# Patient Record
Sex: MALE | Race: WHITE | Employment: FULL TIME | URBAN - METROPOLITAN AREA
[De-identification: names, ages, dates, MRNs, and addresses within clinical notes are randomized per-mention and may not be internally consistent; named-entity substitution may affect disease eponyms.]

---

## 2018-06-19 ENCOUNTER — HOSPITAL ENCOUNTER (OUTPATIENT)
Facility: CLINIC | Age: 63
Discharge: HOME | End: 2018-06-19
Attending: COLON & RECTAL SURGERY | Admitting: COLON & RECTAL SURGERY
Payer: COMMERCIAL

## 2018-06-19 ENCOUNTER — HOSPITAL ENCOUNTER (OUTPATIENT)
Dept: GENERAL RADIOLOGY | Facility: CLINIC | Age: 63
End: 2018-06-19
Attending: COLON & RECTAL SURGERY | Admitting: COLON & RECTAL SURGERY
Payer: COMMERCIAL

## 2018-06-19 ENCOUNTER — SURGERY (OUTPATIENT)
Age: 63
End: 2018-06-19

## 2018-06-19 VITALS
RESPIRATION RATE: 21 BRPM | WEIGHT: 140 LBS | SYSTOLIC BLOOD PRESSURE: 113 MMHG | OXYGEN SATURATION: 97 % | HEIGHT: 67 IN | BODY MASS INDEX: 21.97 KG/M2 | DIASTOLIC BLOOD PRESSURE: 82 MMHG

## 2018-06-19 DIAGNOSIS — K63.89 COLONIC MASS: ICD-10-CM

## 2018-06-19 LAB — COLONOSCOPY: NORMAL

## 2018-06-19 PROCEDURE — 74283 THER NMA RDCTJ INTUS/OBSTRCJ: CPT

## 2018-06-19 PROCEDURE — G0500 MOD SEDAT ENDO SERVICE >5YRS: HCPCS | Performed by: COLON & RECTAL SURGERY

## 2018-06-19 PROCEDURE — 45380 COLONOSCOPY AND BIOPSY: CPT | Performed by: COLON & RECTAL SURGERY

## 2018-06-19 PROCEDURE — 45385 COLONOSCOPY W/LESION REMOVAL: CPT | Performed by: COLON & RECTAL SURGERY

## 2018-06-19 PROCEDURE — 25000128 H RX IP 250 OP 636: Performed by: COLON & RECTAL SURGERY

## 2018-06-19 PROCEDURE — 88305 TISSUE EXAM BY PATHOLOGIST: CPT | Mod: 26 | Performed by: COLON & RECTAL SURGERY

## 2018-06-19 PROCEDURE — 99153 MOD SED SAME PHYS/QHP EA: CPT

## 2018-06-19 PROCEDURE — 88305 TISSUE EXAM BY PATHOLOGIST: CPT | Performed by: COLON & RECTAL SURGERY

## 2018-06-19 RX ORDER — FENTANYL CITRATE 50 UG/ML
INJECTION, SOLUTION INTRAMUSCULAR; INTRAVENOUS PRN
Status: DISCONTINUED | OUTPATIENT
Start: 2018-06-19 | End: 2018-06-19 | Stop reason: HOSPADM

## 2018-06-19 RX ORDER — DIPHENHYDRAMINE HCL 25 MG
25 CAPSULE ORAL EVERY 4 HOURS PRN
Status: DISCONTINUED | OUTPATIENT
Start: 2018-06-19 | End: 2018-06-19 | Stop reason: HOSPADM

## 2018-06-19 RX ORDER — LIDOCAINE 40 MG/G
CREAM TOPICAL
Status: DISCONTINUED | OUTPATIENT
Start: 2018-06-19 | End: 2018-06-19 | Stop reason: HOSPADM

## 2018-06-19 RX ORDER — ONDANSETRON 2 MG/ML
4 INJECTION INTRAMUSCULAR; INTRAVENOUS
Status: DISCONTINUED | OUTPATIENT
Start: 2018-06-19 | End: 2018-06-19 | Stop reason: HOSPADM

## 2018-06-19 RX ORDER — DIPHENHYDRAMINE HYDROCHLORIDE 50 MG/ML
25 INJECTION INTRAMUSCULAR; INTRAVENOUS EVERY 4 HOURS PRN
Status: DISCONTINUED | OUTPATIENT
Start: 2018-06-19 | End: 2018-06-19 | Stop reason: HOSPADM

## 2018-06-19 RX ORDER — NALOXONE HYDROCHLORIDE 0.4 MG/ML
.1-.4 INJECTION, SOLUTION INTRAMUSCULAR; INTRAVENOUS; SUBCUTANEOUS
Status: DISCONTINUED | OUTPATIENT
Start: 2018-06-19 | End: 2018-06-19 | Stop reason: HOSPADM

## 2018-06-19 RX ORDER — ONDANSETRON 4 MG/1
4 TABLET, ORALLY DISINTEGRATING ORAL EVERY 6 HOURS PRN
Status: DISCONTINUED | OUTPATIENT
Start: 2018-06-19 | End: 2018-06-19 | Stop reason: HOSPADM

## 2018-06-19 RX ORDER — FLUMAZENIL 0.1 MG/ML
0.2 INJECTION, SOLUTION INTRAVENOUS
Status: DISCONTINUED | OUTPATIENT
Start: 2018-06-19 | End: 2018-06-19 | Stop reason: HOSPADM

## 2018-06-19 RX ORDER — ONDANSETRON 2 MG/ML
4 INJECTION INTRAMUSCULAR; INTRAVENOUS EVERY 6 HOURS PRN
Status: DISCONTINUED | OUTPATIENT
Start: 2018-06-19 | End: 2018-06-19 | Stop reason: HOSPADM

## 2018-06-19 RX ORDER — PROCHLORPERAZINE MALEATE 10 MG
10 TABLET ORAL EVERY 6 HOURS PRN
Status: DISCONTINUED | OUTPATIENT
Start: 2018-06-19 | End: 2018-06-19 | Stop reason: HOSPADM

## 2018-06-19 RX ADMIN — MIDAZOLAM 2 MG: 1 INJECTION INTRAMUSCULAR; INTRAVENOUS at 10:41

## 2018-06-19 RX ADMIN — GLUCAGON HYDROCHLORIDE 0.5 MG: KIT at 11:15

## 2018-06-19 RX ADMIN — DIATRIZOATE MEGLUMINE AND DIATRIZOATE SODIUM 480 ML: 660; 100 SOLUTION ORAL; RECTAL at 10:11

## 2018-06-19 RX ADMIN — FENTANYL CITRATE 100 MCG: 50 INJECTION, SOLUTION INTRAMUSCULAR; INTRAVENOUS at 10:41

## 2018-06-19 RX ADMIN — FENTANYL CITRATE 50 MCG: 50 INJECTION, SOLUTION INTRAMUSCULAR; INTRAVENOUS at 11:10

## 2018-06-19 NOTE — H&P
"Pre-Endoscopy History and Physical     Manuel Andre MRN# [id number]   YOB: 1955 Age: 63 year old     Date of Procedure: 6/19/2018  Primary care provider: Adiel Meade  Type of Endoscopy: colonoscopy  Reason for Procedure: screening  Type of Anesthesia Anticipated: Moderate Sedation    HPI:    Manuel is a 63 year old male who will be undergoing the above procedure.      A history and physical has been performed. The patient's medications and allergies have been reviewed. The risks and benefits of the procedure including the risk of bleeding, perforation, and missed lesions as well as the sedation options and risks were discussed with the patient.  All questions were answered and informed consent was obtained.      No Known Allergies     Current Facility-Administered Medications   Medication     lidocaine (LMX4) cream     lidocaine 1 % 1 mL     ondansetron (ZOFRAN) injection 4 mg     sodium chloride (PF) 0.9% PF flush 3 mL     sodium chloride (PF) 0.9% PF flush 3 mL       No prescriptions prior to admission.       There is no problem list on file for this patient.       History reviewed. No pertinent past medical history.     Past Surgical History:   Procedure Laterality Date     HERNIA REPAIR       STOMACH SURGERY      for  ulcer       Social History   Substance Use Topics     Smoking status: Current Every Day Smoker     Years: 45.00     Smokeless tobacco: Never Used      Comment: 15 cigarettes a day     Alcohol use Yes      Comment: occas       History reviewed. No pertinent family history.      PHYSICAL EXAM:   /82  Resp 16  Ht 1.7 m (5' 6.93\")  Wt 63.5 kg (140 lb)  SpO2 96%  BMI 21.97 kg/m2 Estimated body mass index is 21.97 kg/(m^2) as calculated from the following:    Height as of this encounter: 1.7 m (5' 6.93\").    Weight as of this encounter: 63.5 kg (140 lb).   Mental status - alert and oriented  RESP: lungs clear  CV: RRR  AIRWAY EXAM: Mallampatti Class II (visualization of the soft " palate, fauces, and uvula)    IMPRESSION   ASA Class 2 - Mild systemic disease      Signed Electronically by: Momo Andrews  June 19, 2018    Colorectal Surgery  [phone] (office)  [phone] (pager)  [email or web address]

## 2018-06-20 LAB — COPATH REPORT: NORMAL

## 2018-07-24 ENCOUNTER — HOSPITAL ENCOUNTER (INPATIENT)
Facility: CLINIC | Age: 63
Setting detail: SURGERY ADMIT
End: 2018-07-24
Attending: COLON & RECTAL SURGERY | Admitting: COLON & RECTAL SURGERY
Payer: COMMERCIAL

## 2018-07-24 ENCOUNTER — TRANSFERRED RECORDS (OUTPATIENT)
Dept: HEALTH INFORMATION MANAGEMENT | Facility: CLINIC | Age: 63
End: 2018-07-24

## 2018-07-26 ENCOUNTER — HOSPITAL ENCOUNTER (OUTPATIENT)
Dept: GENERAL RADIOLOGY | Facility: CLINIC | Age: 63
Discharge: HOME | End: 2018-07-26
Attending: COLON & RECTAL SURGERY | Admitting: COLON & RECTAL SURGERY
Payer: COMMERCIAL

## 2018-07-26 DIAGNOSIS — K63.3 ULCER OF SMALL INTESTINE: ICD-10-CM

## 2018-07-26 PROCEDURE — 74245 XR UPPER GI W SMALL BOWEL FOLLOW THROUGH: CPT

## 2018-07-26 PROCEDURE — 25500045 ZZH RX 255: Performed by: COLON & RECTAL SURGERY

## 2018-07-26 RX ADMIN — ANTACID/ANTIFLATULENT 4 G: 380; 550; 10; 10 GRANULE, EFFERVESCENT ORAL at 07:53

## 2018-08-06 ENCOUNTER — OFFICE VISIT (OUTPATIENT)
Dept: SURGERY | Facility: CLINIC | Age: 63
End: 2018-08-06
Payer: COMMERCIAL

## 2018-08-06 VITALS
BODY MASS INDEX: 22.5 KG/M2 | DIASTOLIC BLOOD PRESSURE: 84 MMHG | HEART RATE: 74 BPM | HEIGHT: 66 IN | SYSTOLIC BLOOD PRESSURE: 124 MMHG | WEIGHT: 140 LBS

## 2018-08-06 DIAGNOSIS — K63.2 ENTERO-ENTERIC FISTULA: Primary | ICD-10-CM

## 2018-08-06 PROCEDURE — 99243 OFF/OP CNSLTJ NEW/EST LOW 30: CPT | Performed by: SURGERY

## 2018-08-06 NOTE — MR AVS SNAPSHOT
"              After Visit Summary   2018    Manuel Andre    MRN: [id number]           Patient Information     Date Of Birth          1955        Visit Information        Provider Department      2018 9:45 AM Ace Harding MD; [facility] General Surgery       Follow-ups after your visit        Who to contact     If you have questions or need follow up information about today's clinic visit or your schedule please contact [facility] directly at [phone].  Normal or non-critical lab and imaging results will be communicated to you by [employer], letter or phone within 4 business days after the clinic has received the results. If you do not hear from us within 7 days, please contact the clinic through [employer] or phone. If you have a critical or abnormal lab result, we will notify you by phone as soon as possible.  Submit refill requests through [employer] or call your pharmacy and they will forward the refill request to us. Please allow 3 business days for your refill to be completed.          Additional Information About Your Visit        MyChart Information     [employer] lets you send messages to your doctor, view your test results, renew your prescriptions, schedule appointments and more. To sign up, go to [email or web address] . Click on \"Log in\" on the left side of the screen, which will take you to the Welcome page. Then click on \"Sign up Now\" on the right side of the page.     You will be asked to enter the access code listed below, as well as some personal information. Please follow the directions to create your username and password.     Your access code is: [email or web address]  Expires: 10/24/2018  7:17 AM     Your access code will  in 90 days. If you need help or a new code, please call your [place] clinic or [phone].        Care EveryWhere ID     This is your Care EveryWhere ID. This could be used by other " "organizations to access your [place] medical records  [id number]        Your Vitals Were     Pulse Height BMI (Body Mass Index)             74 5' 6\" (1.676 m) 22.6 kg/m2          Blood Pressure from Last 3 Encounters:   08/06/18 124/84   06/19/18 113/82    Weight from Last 3 Encounters:   08/06/18 140 lb (63.5 kg)   06/19/18 140 lb (63.5 kg)              Today, you had the following     No orders found for display       Primary Care Provider Office Phone # Fax #    Adiel Meade [phone]       [facility] [address] [place] [address]        Equal Access to Services     CONCHITA MALDONADO : Everardo Rico, sophia ochoa, qastephanie kaalmasrikanth salazar, lola castillo . So [facility] [phone].    ATENCIÓN: Si habla español, tiene a juarez disposición servicios gratuitos de asistencia lingüística. Llame al [phone].    We comply with applicable federal civil rights laws and [place] laws. We do not discriminate on the basis of race, color, national origin, age, disability, sex, sexual orientation, or gender identity.            Thank you!     Thank you for choosing [facility]  for your care. Our goal is always to provide you with excellent care. Hearing back from our patients is one way we can continue to improve our services. Please take a few minutes to complete the written survey that you may receive in the mail after your visit with us. Thank you!             Your Updated Medication List - Protect others around you: Learn how to safely use, store and throw away your medicines at www.disposemymeds.org.          This list is accurate as of 8/6/18 10:28 AM.  Always use your most recent med list.                   Brand Name Dispense Instructions for use Diagnosis    OMEPRAZOLE PO             "

## 2018-08-06 NOTE — LETTER
2018    Re: Manuel Andre, : 1955    HISTORY OF PRESENT ILLNESS:  Manuel Andre is a 63 year old male who is seen in consultation at the request of Dr. 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  Mr. Andre is here with his daughter and a , he describes his previous operation as perforated stomach ulcers or portion of the stomach was removed.  Is evident that with his symptoms and radiologic findings he has developed, likely multiple, gastroenteric and or gastrocolonic fistulas.  He continues to smoke.     REVIEW OF SYSTEMS:  Constitutional:  Negative for chills, fatigue, fever.  He has lost significant weight over the last few months change.  Neuro: No extremity, nor facial weakness  Psych:  No unexpected changes in mood  Eyes:  Negative for new vision problems.  ENT:  Negative for ENT pain.  Cardiovascular:  Negative for chest pain, palpitations.  Respiratory:  Negative for cough, dyspnea.  Gastrointestinal:  Negative for abdominal pain.  Significant diarrhea with any oral intake.  Musculoskeletal:  Negative for new arthralgias or myalgias.  Integumentary: No new rashes nor lesions     EXAM:  GENERAL: healthy, alert and no distress      HEENT: moist mucus membranes, no scleral icterus,   CARDIOVASCULAR:  RRR, No JVD  NEURO:  Alert;  well oriented to time, place and person.  RESPIRATORY: non labored breathing  NECK: Neck supple. No noticeable masses.  No lymphadenopathy noted.  ABDOMEN/GI: soft, nontender, nondistended  EXTREMITIES: warm and well perfused, no edema  SKIN: No suspicious lesions or rashes     LABS/Imaging: Endoscopies, radiologic studies, and laboratories reviewed with patient.     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     PLAN:  Abdominal exploration with lysis of adhesion, fistulas takedown and bowel resection as necessary.     Manuel Andre understands the risk, benefits, hopeful outcomes, and  possible complications, both in the short and in the long term.  All his questions answered, he will like to proceed with the propose procedure in the near future.     It is my pleasure to participate in the care of Manuel Andre. Thank you for this consultation.      If you have any questions please give me a call.     Best regards,  Ace Harding MD

## 2018-08-10 NOTE — PROGRESS NOTES
[facility] General Surgery Clinic Consultation    CHIEF COMPLAINT:  Chief Complaint   Patient presents with     Consult     insestinal fistula       HISTORY OF PRESENT ILLNESS:  Manuel Andre is a 63 year old male who is seen in consultation at the request of Dr. 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  Mr. Andre is here with his daughter and a , he describes his previous operation as perforated stomach ulcers or portion of the stomach was removed.  Is evident that with his symptoms and radiologic findings he has developed, likely multiple, gastroenteric and or gastrocolonic fistulas.  He continues to smoke.    REVIEW OF SYSTEMS:  Constitutional:  Negative for chills, fatigue, fever.  He has lost significant weight over the last few months change.  Neuro: No extremity, nor facial weakness  Psych:  No unexpected changes in mood  Eyes:  Negative for new vision problems.  ENT:  Negative for ENT pain.  Cardiovascular:  Negative for chest pain, palpitations.  Respiratory:  Negative for cough, dyspnea.  Gastrointestinal:  Negative for abdominal pain.  Significant diarrhea with any oral intake.  Musculoskeletal:  Negative for new arthralgias or myalgias.  Integumentary: No new rashes nor lesions    Past Medical History:   Diagnosis Date     History of blood transfusion        Past Surgical History:   Procedure Laterality Date     COLONOSCOPY N/A 6/19/2018    Procedure: COMBINED COLONOSCOPY, SINGLE OR MULTIPLE BIOPSY/POLYPECTOMY BY BIOPSY;;  Surgeon: Momo Andrews MD;  Location:  GI     HERNIA REPAIR       STOMACH SURGERY      for  ulcer       Family History has been reviewed.    Social History   Substance Use Topics     Smoking status: Current Every Day Smoker     Years: 45.00     Smokeless tobacco: Never Used      Comment: 15 cigarettes a day     Alcohol use Yes      Comment: occas       There is no problem list on file for this patient.      No Known  "Allergies    Current Outpatient Prescriptions   Medication Sig Dispense Refill     OMEPRAZOLE PO          Vitals: /84  Pulse 74  Ht 5' 6\" (1.676 m)  Wt 140 lb (63.5 kg)  BMI 22.6 kg/m2  BMI= Body mass index is 22.6 kg/(m^2).    EXAM:  GENERAL: healthy, alert and no distress     HEENT: moist mucus membranes, no scleral icterus,   CARDIOVASCULAR:  RRR, No JVD  NEURO:  Alert;  well oriented to time, place and person.  RESPIRATORY: non labored breathing  NECK: Neck supple. No noticeable masses.  No lymphadenopathy noted.  ABDOMEN/GI: soft, nontender, nondistended  EXTREMITIES: warm and well perfused, no edema  SKIN: No suspicious lesions or rashes    LABS/Imaging: Endoscopies, radiologic studies, and laboratories reviewed with patient.    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    PLAN:  Abdominal exploration with lysis of adhesion, fistulas takedown and bowel resection as necessary.    Manuel Andre understands the risk, benefits, hopeful outcomes, and possible complications, both in the short and in the long term.  All his questions answered, he will like to proceed with the propose procedure in the near future.    It is my pleasure to participate in the care of Manuel Andre. Thank you for this consultation.     If you have any questions please give me a call.    Best regards,  Ace Harding MD    Please route or send letter to:  Primary Care Provider (PCP), Referring Provider and Include Progress Note    Total time with patient visit: 45 minutes more than half spent in counseling, explanation of procedures and coordination of care.    "

## 2018-08-15 ENCOUNTER — TELEPHONE (OUTPATIENT)
Dept: SURGERY | Facility: CLINIC | Age: 63
End: 2018-08-15

## 2018-08-15 NOTE — TELEPHONE ENCOUNTER
Type of surgery: abdominal exploration bowel resection coordinated with Dr Andrews  Location of surgery: [facility]  Date and time of surgery: 09/21/18 7:30 am  Surgeon: Dr Harding  Pre-Op Appt Date: pt to schedule  Post-Op Appt Date: pt to schedule   Packet sent out: Yes  Pre-cert/Authorization completed:  Not Applicable  Date: 08/06/18    AM Admit  General anesthesia  Dr Andrews's office will send bowel prep to pt

## 2018-09-01 ENCOUNTER — APPOINTMENT (OUTPATIENT)
Dept: LAB | Facility: CLINIC | Age: 63
End: 2018-09-01
Attending: COLON & RECTAL SURGERY
Payer: COMMERCIAL

## 2018-09-13 ENCOUNTER — HOSPITAL ENCOUNTER (OUTPATIENT)
Facility: CLINIC | Age: 63
Discharge: HOME | End: 2018-09-13
Attending: COLON & RECTAL SURGERY | Admitting: COLON & RECTAL SURGERY
Payer: COMMERCIAL

## 2018-09-13 PROCEDURE — 27210220 ZZH KIT SHRLOCK 5FR DUAL LUM PICC

## 2018-09-13 PROCEDURE — 36569 INSJ PICC 5 YR+ W/O IMAGING: CPT

## 2018-09-17 ENCOUNTER — MEDICAL CORRESPONDENCE (OUTPATIENT)
Dept: PHARMACY | Facility: CLINIC | Age: 63
End: 2018-09-17

## 2018-09-17 ENCOUNTER — HOME INFUSION (PRE-WILLOW HOME INFUSION) (OUTPATIENT)
Dept: PHARMACY | Facility: CLINIC | Age: 63
End: 2018-09-17

## 2018-09-17 PROCEDURE — 80053 COMPREHEN METABOLIC PANEL: CPT | Performed by: COLON & RECTAL SURGERY

## 2018-09-17 PROCEDURE — 85025 COMPLETE CBC W/AUTO DIFF WBC: CPT | Performed by: COLON & RECTAL SURGERY

## 2018-09-17 PROCEDURE — 82248 BILIRUBIN DIRECT: CPT | Performed by: COLON & RECTAL SURGERY

## 2018-09-17 PROCEDURE — 84134 ASSAY OF PREALBUMIN: CPT | Performed by: COLON & RECTAL SURGERY

## 2018-09-17 PROCEDURE — 84100 ASSAY OF PHOSPHORUS: CPT | Performed by: COLON & RECTAL SURGERY

## 2018-09-17 PROCEDURE — 84478 ASSAY OF TRIGLYCERIDES: CPT | Performed by: COLON & RECTAL SURGERY

## 2018-09-17 PROCEDURE — 83735 ASSAY OF MAGNESIUM: CPT | Performed by: COLON & RECTAL SURGERY

## 2018-09-18 ENCOUNTER — HOME INFUSION (PRE-WILLOW HOME INFUSION) (OUTPATIENT)
Dept: PHARMACY | Facility: CLINIC | Age: 63
End: 2018-09-18

## 2018-09-18 LAB
ALBUMIN SERPL-MCNC: 2.6 G/DL (ref 3.4–5)
ALP SERPL-CCNC: 106 U/L (ref 40–150)
ALT SERPL W P-5'-P-CCNC: 46 U/L (ref 0–70)
ANION GAP SERPL CALCULATED.3IONS-SCNC: 8 MMOL/L (ref 3–14)
AST SERPL W P-5'-P-CCNC: 36 U/L (ref 0–45)
BASOPHILS # BLD AUTO: 0 10E9/L (ref 0–0.2)
BASOPHILS NFR BLD AUTO: 0.2 %
BILIRUB DIRECT SERPL-MCNC: <0.1 MG/DL (ref 0–0.2)
BILIRUB SERPL-MCNC: 0.4 MG/DL (ref 0.2–1.3)
BUN SERPL-MCNC: 11 MG/DL (ref 7–30)
CALCIUM SERPL-MCNC: 7.9 MG/DL (ref 8.5–10.1)
CHLORIDE SERPL-SCNC: 112 MMOL/L (ref 94–109)
CO2 SERPL-SCNC: 21 MMOL/L (ref 20–32)
CREAT SERPL-MCNC: 0.63 MG/DL (ref 0.66–1.25)
DIFFERENTIAL METHOD BLD: ABNORMAL
EOSINOPHIL # BLD AUTO: 0.2 10E9/L (ref 0–0.7)
EOSINOPHIL NFR BLD AUTO: 2.4 %
ERYTHROCYTE [DISTWIDTH] IN BLOOD BY AUTOMATED COUNT: 14 % (ref 10–15)
GFR SERPL CREATININE-BSD FRML MDRD: >90 ML/MIN/1.7M2
GLUCOSE SERPL-MCNC: 87 MG/DL (ref 70–99)
HCT VFR BLD AUTO: 31.2 % (ref 40–53)
HGB BLD-MCNC: 10 G/DL (ref 13.3–17.7)
IMM GRANULOCYTES # BLD: 0 10E9/L (ref 0–0.4)
IMM GRANULOCYTES NFR BLD: 0.1 %
LYMPHOCYTES # BLD AUTO: 1.9 10E9/L (ref 0.8–5.3)
LYMPHOCYTES NFR BLD AUTO: 22.2 %
MAGNESIUM SERPL-MCNC: 1.9 MG/DL (ref 1.6–2.3)
MCH RBC QN AUTO: 33.1 PG (ref 26.5–33)
MCHC RBC AUTO-ENTMCNC: 32.1 G/DL (ref 31.5–36.5)
MCV RBC AUTO: 103 FL (ref 78–100)
MONOCYTES # BLD AUTO: 0.7 10E9/L (ref 0–1.3)
MONOCYTES NFR BLD AUTO: 8.2 %
NEUTROPHILS # BLD AUTO: 5.6 10E9/L (ref 1.6–8.3)
NEUTROPHILS NFR BLD AUTO: 66.9 %
NRBC # BLD AUTO: 0 10*3/UL
NRBC BLD AUTO-RTO: 0 /100
PHOSPHATE SERPL-MCNC: 2.6 MG/DL (ref 2.5–4.5)
PLATELET # BLD AUTO: 263 10E9/L (ref 150–450)
POTASSIUM SERPL-SCNC: 4.5 MMOL/L (ref 3.4–5.3)
PREALB SERPL IA-MCNC: 14 MG/DL (ref 15–45)
PROT SERPL-MCNC: 5.9 G/DL (ref 6.8–8.8)
RBC # BLD AUTO: 3.02 10E12/L (ref 4.4–5.9)
SODIUM SERPL-SCNC: 141 MMOL/L (ref 133–144)
TRIGL SERPL-MCNC: 95 MG/DL
WBC # BLD AUTO: 8.3 10E9/L (ref 4–11)

## 2018-09-18 NOTE — PROGRESS NOTES
This is a recent snapshot of the patient's [facility] medical record.  For current drug dose and complete information and questions, call [phone] or In [facility] pool, fv home infusion ([id number])  CSN Number:  [id number]

## 2018-09-19 NOTE — PROGRESS NOTES
This is a recent snapshot of the patient's [facility] medical record.  For current drug dose and complete information and questions, call [phone] or In [facility] pool, fv home infusion ([id number])  CSN Number:  [id number]

## 2018-09-21 ENCOUNTER — HOSPITAL ENCOUNTER (INPATIENT)
Facility: CLINIC | Age: 63
LOS: 5 days | Discharge: HOME HEALTH | End: 2018-09-26
Attending: SURGERY | Admitting: SURGERY
Payer: COMMERCIAL

## 2018-09-21 ENCOUNTER — ANESTHESIA (OUTPATIENT)
Dept: SURGERY | Facility: CLINIC | Age: 63
End: 2018-09-21
Payer: COMMERCIAL

## 2018-09-21 ENCOUNTER — ANESTHESIA EVENT (OUTPATIENT)
Dept: SURGERY | Facility: CLINIC | Age: 63
End: 2018-09-21
Payer: COMMERCIAL

## 2018-09-21 ENCOUNTER — APPOINTMENT (OUTPATIENT)
Dept: SURGERY | Facility: PHYSICIAN GROUP | Age: 63
End: 2018-09-21
Payer: COMMERCIAL

## 2018-09-21 ENCOUNTER — APPOINTMENT (OUTPATIENT)
Dept: GENERAL RADIOLOGY | Facility: CLINIC | Age: 63
End: 2018-09-21
Attending: SURGERY
Payer: COMMERCIAL

## 2018-09-21 DIAGNOSIS — K63.2 COLO-ENTERIC FISTULA: Primary | ICD-10-CM

## 2018-09-21 PROBLEM — K31.6 GASTROCOLIC FISTULA: Status: ACTIVE | Noted: 2018-09-21

## 2018-09-21 LAB
ALBUMIN SERPL-MCNC: 2.1 G/DL (ref 3.4–5)
ALP SERPL-CCNC: 91 U/L (ref 40–150)
ALT SERPL W P-5'-P-CCNC: 41 U/L (ref 0–70)
ANION GAP SERPL CALCULATED.3IONS-SCNC: 6 MMOL/L (ref 3–14)
AST SERPL W P-5'-P-CCNC: 35 U/L (ref 0–45)
BILIRUB SERPL-MCNC: 0.3 MG/DL (ref 0.2–1.3)
BUN SERPL-MCNC: 4 MG/DL (ref 7–30)
CALCIUM SERPL-MCNC: 7.9 MG/DL (ref 8.5–10.1)
CHLORIDE SERPL-SCNC: 107 MMOL/L (ref 94–109)
CO2 SERPL-SCNC: 27 MMOL/L (ref 20–32)
CREAT SERPL-MCNC: 0.63 MG/DL (ref 0.66–1.25)
GFR SERPL CREATININE-BSD FRML MDRD: >90 ML/MIN/1.7M2
GLUCOSE BLDC GLUCOMTR-MCNC: 108 MG/DL (ref 70–99)
GLUCOSE BLDC GLUCOMTR-MCNC: 98 MG/DL (ref 70–99)
GLUCOSE SERPL-MCNC: 91 MG/DL (ref 70–99)
HGB BLD-MCNC: 11.5 G/DL (ref 13.3–17.7)
INR PPP: 1.05 (ref 0.86–1.14)
MAGNESIUM SERPL-MCNC: 1.6 MG/DL (ref 1.6–2.3)
PHOSPHATE SERPL-MCNC: 4.2 MG/DL (ref 2.5–4.5)
PLATELET # BLD AUTO: 237 10E9/L (ref 150–450)
POTASSIUM SERPL-SCNC: 3.8 MMOL/L (ref 3.4–5.3)
PROT SERPL-MCNC: 5.2 G/DL (ref 6.8–8.8)
SODIUM SERPL-SCNC: 140 MMOL/L (ref 133–144)
TRIGL SERPL-MCNC: 44 MG/DL

## 2018-09-21 PROCEDURE — 85049 AUTOMATED PLATELET COUNT: CPT | Performed by: PHYSICIAN ASSISTANT

## 2018-09-21 PROCEDURE — 25000125 ZZHC RX 250

## 2018-09-21 PROCEDURE — 00000146 ZZHCL STATISTIC GLUCOSE BY METER IP

## 2018-09-21 PROCEDURE — 0DBA0ZZ EXCISION OF JEJUNUM, OPEN APPROACH: ICD-10-PCS | Performed by: SURGERY

## 2018-09-21 PROCEDURE — 44650 REPAIR BOWEL FISTULA: CPT | Mod: AS | Performed by: PHYSICIAN ASSISTANT

## 2018-09-21 PROCEDURE — 71000013 ZZH RECOVERY PHASE 1 LEVEL 1 EA ADDTL HR: Performed by: SURGERY

## 2018-09-21 PROCEDURE — 25000128 H RX IP 250 OP 636: Performed by: NURSE ANESTHETIST, CERTIFIED REGISTERED

## 2018-09-21 PROCEDURE — 12000007 ZZH R&B INTERMEDIATE

## 2018-09-21 PROCEDURE — 25000132 ZZH RX MED GY IP 250 OP 250 PS 637: Performed by: SURGERY

## 2018-09-21 PROCEDURE — 36000063 ZZH SURGERY LEVEL 4 EA 15 ADDTL MIN: Performed by: SURGERY

## 2018-09-21 PROCEDURE — 25000128 H RX IP 250 OP 636: Performed by: PHYSICIAN ASSISTANT

## 2018-09-21 PROCEDURE — 84478 ASSAY OF TRIGLYCERIDES: CPT | Performed by: SURGERY

## 2018-09-21 PROCEDURE — 84100 ASSAY OF PHOSPHORUS: CPT | Performed by: SURGERY

## 2018-09-21 PROCEDURE — 71000012 ZZH RECOVERY PHASE 1 LEVEL 1 FIRST HR: Performed by: SURGERY

## 2018-09-21 PROCEDURE — 0DNU0ZZ RELEASE OMENTUM, OPEN APPROACH: ICD-10-PCS | Performed by: SURGERY

## 2018-09-21 PROCEDURE — 27210794 ZZH OR GENERAL SUPPLY STERILE: Performed by: SURGERY

## 2018-09-21 PROCEDURE — 40000239 ZZH STATISTIC VAT ROUNDS

## 2018-09-21 PROCEDURE — 88304 TISSUE EXAM BY PATHOLOGIST: CPT | Performed by: SURGERY

## 2018-09-21 PROCEDURE — 25000125 ZZHC RX 250: Performed by: PHYSICIAN ASSISTANT

## 2018-09-21 PROCEDURE — 88304 TISSUE EXAM BY PATHOLOGIST: CPT | Mod: 26 | Performed by: SURGERY

## 2018-09-21 PROCEDURE — 36000093 ZZH SURGERY LEVEL 4 1ST 30 MIN: Performed by: SURGERY

## 2018-09-21 PROCEDURE — 27210995 ZZH RX 272: Performed by: SURGERY

## 2018-09-21 PROCEDURE — 43633 REMOVAL OF STOMACH PARTIAL: CPT | Mod: AS | Performed by: PHYSICIAN ASSISTANT

## 2018-09-21 PROCEDURE — 0DB60ZZ EXCISION OF STOMACH, OPEN APPROACH: ICD-10-PCS | Performed by: SURGERY

## 2018-09-21 PROCEDURE — 43633 REMOVAL OF STOMACH PARTIAL: CPT | Mod: 51 | Performed by: SURGERY

## 2018-09-21 PROCEDURE — 37000009 ZZH ANESTHESIA TECHNICAL FEE, EACH ADDTL 15 MIN: Performed by: SURGERY

## 2018-09-21 PROCEDURE — 83735 ASSAY OF MAGNESIUM: CPT | Performed by: SURGERY

## 2018-09-21 PROCEDURE — 25000125 ZZHC RX 250: Performed by: SURGERY

## 2018-09-21 PROCEDURE — 25000128 H RX IP 250 OP 636

## 2018-09-21 PROCEDURE — 44650 REPAIR BOWEL FISTULA: CPT | Performed by: SURGERY

## 2018-09-21 PROCEDURE — 25000566 ZZH SEVOFLURANE, EA 15 MIN: Performed by: SURGERY

## 2018-09-21 PROCEDURE — 40000257 ZZH STATISTIC CONSULT NO CHARGE VASC ACCESS

## 2018-09-21 PROCEDURE — 25000125 ZZHC RX 250: Performed by: NURSE ANESTHETIST, CERTIFIED REGISTERED

## 2018-09-21 PROCEDURE — 80053 COMPREHEN METABOLIC PANEL: CPT | Performed by: SURGERY

## 2018-09-21 PROCEDURE — 40000170 ZZH STATISTIC PRE-PROCEDURE ASSESSMENT II: Performed by: SURGERY

## 2018-09-21 PROCEDURE — 0D160ZA BYPASS STOMACH TO JEJUNUM, OPEN APPROACH: ICD-10-PCS | Performed by: SURGERY

## 2018-09-21 PROCEDURE — 85610 PROTHROMBIN TIME: CPT | Performed by: SURGERY

## 2018-09-21 PROCEDURE — 0DBL0ZZ EXCISION OF TRANSVERSE COLON, OPEN APPROACH: ICD-10-PCS | Performed by: COLON & RECTAL SURGERY

## 2018-09-21 PROCEDURE — 25000132 ZZH RX MED GY IP 250 OP 250 PS 637: Performed by: COLON & RECTAL SURGERY

## 2018-09-21 PROCEDURE — 71045 X-RAY EXAM CHEST 1 VIEW: CPT

## 2018-09-21 PROCEDURE — 85018 HEMOGLOBIN: CPT | Performed by: ANESTHESIOLOGY

## 2018-09-21 PROCEDURE — 25000128 H RX IP 250 OP 636: Performed by: ANESTHESIOLOGY

## 2018-09-21 PROCEDURE — 37000008 ZZH ANESTHESIA TECHNICAL FEE, 1ST 30 MIN: Performed by: SURGERY

## 2018-09-21 RX ORDER — MAGNESIUM HYDROXIDE 1200 MG/15ML
LIQUID ORAL PRN
Status: DISCONTINUED | OUTPATIENT
Start: 2018-09-21 | End: 2018-09-21 | Stop reason: HOSPADM

## 2018-09-21 RX ORDER — KETOROLAC TROMETHAMINE 15 MG/ML
15 INJECTION, SOLUTION INTRAMUSCULAR; INTRAVENOUS EVERY 6 HOURS PRN
Status: DISCONTINUED | OUTPATIENT
Start: 2018-09-21 | End: 2018-09-26 | Stop reason: HOSPADM

## 2018-09-21 RX ORDER — SODIUM CHLORIDE, SODIUM LACTATE, POTASSIUM CHLORIDE, CALCIUM CHLORIDE 600; 310; 30; 20 MG/100ML; MG/100ML; MG/100ML; MG/100ML
INJECTION, SOLUTION INTRAVENOUS CONTINUOUS PRN
Status: DISCONTINUED | OUTPATIENT
Start: 2018-09-21 | End: 2018-09-21

## 2018-09-21 RX ORDER — DEXAMETHASONE SODIUM PHOSPHATE 4 MG/ML
INJECTION, SOLUTION INTRA-ARTICULAR; INTRALESIONAL; INTRAMUSCULAR; INTRAVENOUS; SOFT TISSUE PRN
Status: DISCONTINUED | OUTPATIENT
Start: 2018-09-21 | End: 2018-09-21

## 2018-09-21 RX ORDER — LIDOCAINE 40 MG/G
CREAM TOPICAL
Status: DISCONTINUED | OUTPATIENT
Start: 2018-09-21 | End: 2018-09-26 | Stop reason: HOSPADM

## 2018-09-21 RX ORDER — ONDANSETRON 4 MG/1
4 TABLET, ORALLY DISINTEGRATING ORAL EVERY 6 HOURS PRN
Status: DISCONTINUED | OUTPATIENT
Start: 2018-09-21 | End: 2018-09-26 | Stop reason: HOSPADM

## 2018-09-21 RX ORDER — CYANOCOBALAMIN 1000 UG/ML
1 INJECTION, SOLUTION INTRAMUSCULAR; SUBCUTANEOUS
COMMUNITY

## 2018-09-21 RX ORDER — CEFOTETAN DISODIUM 1 G/10ML
1 INJECTION, POWDER, FOR SOLUTION INTRAMUSCULAR; INTRAVENOUS EVERY 12 HOURS
Status: COMPLETED | OUTPATIENT
Start: 2018-09-21 | End: 2018-09-22

## 2018-09-21 RX ORDER — CELECOXIB 200 MG/1
200 CAPSULE ORAL ONCE
Status: COMPLETED | OUTPATIENT
Start: 2018-09-21 | End: 2018-09-21

## 2018-09-21 RX ORDER — ONDANSETRON 2 MG/ML
INJECTION INTRAMUSCULAR; INTRAVENOUS PRN
Status: DISCONTINUED | OUTPATIENT
Start: 2018-09-21 | End: 2018-09-21

## 2018-09-21 RX ORDER — ONDANSETRON 4 MG/1
4 TABLET, ORALLY DISINTEGRATING ORAL EVERY 30 MIN PRN
Status: DISCONTINUED | OUTPATIENT
Start: 2018-09-21 | End: 2018-09-21 | Stop reason: HOSPADM

## 2018-09-21 RX ORDER — CEFOTETAN DISODIUM 1 G/10ML
1 INJECTION, POWDER, FOR SOLUTION INTRAMUSCULAR; INTRAVENOUS
Status: COMPLETED | OUTPATIENT
Start: 2018-09-21 | End: 2018-09-21

## 2018-09-21 RX ORDER — FENTANYL CITRATE 50 UG/ML
25-50 INJECTION, SOLUTION INTRAMUSCULAR; INTRAVENOUS
Status: DISCONTINUED | OUTPATIENT
Start: 2018-09-21 | End: 2018-09-21 | Stop reason: HOSPADM

## 2018-09-21 RX ORDER — EPHEDRINE SULFATE 50 MG/ML
INJECTION, SOLUTION INTRAMUSCULAR; INTRAVENOUS; SUBCUTANEOUS PRN
Status: DISCONTINUED | OUTPATIENT
Start: 2018-09-21 | End: 2018-09-21

## 2018-09-21 RX ORDER — ACETAMINOPHEN 325 MG/1
975 TABLET ORAL ONCE
Status: DISCONTINUED | OUTPATIENT
Start: 2018-09-21 | End: 2018-09-21 | Stop reason: HOSPADM

## 2018-09-21 RX ORDER — NALOXONE HYDROCHLORIDE 0.4 MG/ML
.1-.4 INJECTION, SOLUTION INTRAMUSCULAR; INTRAVENOUS; SUBCUTANEOUS
Status: DISCONTINUED | OUTPATIENT
Start: 2018-09-21 | End: 2018-09-22

## 2018-09-21 RX ORDER — HYDROMORPHONE HYDROCHLORIDE 1 MG/ML
.3-.5 INJECTION, SOLUTION INTRAMUSCULAR; INTRAVENOUS; SUBCUTANEOUS EVERY 5 MIN PRN
Status: DISCONTINUED | OUTPATIENT
Start: 2018-09-21 | End: 2018-09-21 | Stop reason: HOSPADM

## 2018-09-21 RX ORDER — LIDOCAINE HYDROCHLORIDE 20 MG/ML
INJECTION, SOLUTION INFILTRATION; PERINEURAL PRN
Status: DISCONTINUED | OUTPATIENT
Start: 2018-09-21 | End: 2018-09-21

## 2018-09-21 RX ORDER — SODIUM CHLORIDE, SODIUM LACTATE, POTASSIUM CHLORIDE, CALCIUM CHLORIDE 600; 310; 30; 20 MG/100ML; MG/100ML; MG/100ML; MG/100ML
INJECTION, SOLUTION INTRAVENOUS CONTINUOUS
Status: DISCONTINUED | OUTPATIENT
Start: 2018-09-21 | End: 2018-09-21 | Stop reason: HOSPADM

## 2018-09-21 RX ORDER — ALVIMOPAN 12 MG/1
12 CAPSULE ORAL ONCE
Status: COMPLETED | OUTPATIENT
Start: 2018-09-21 | End: 2018-09-21

## 2018-09-21 RX ORDER — NICOTINE POLACRILEX 4 MG
15-30 LOZENGE BUCCAL
Status: DISCONTINUED | OUTPATIENT
Start: 2018-09-21 | End: 2018-09-26 | Stop reason: HOSPADM

## 2018-09-21 RX ORDER — SODIUM CHLORIDE, SODIUM LACTATE, POTASSIUM CHLORIDE, CALCIUM CHLORIDE 600; 310; 30; 20 MG/100ML; MG/100ML; MG/100ML; MG/100ML
INJECTION, SOLUTION INTRAVENOUS CONTINUOUS
Status: ACTIVE | OUTPATIENT
Start: 2018-09-21 | End: 2018-09-23

## 2018-09-21 RX ORDER — GLYCOPYRROLATE 0.2 MG/ML
INJECTION, SOLUTION INTRAMUSCULAR; INTRAVENOUS PRN
Status: DISCONTINUED | OUTPATIENT
Start: 2018-09-21 | End: 2018-09-21

## 2018-09-21 RX ORDER — ACETAMINOPHEN 325 MG/1
975 TABLET ORAL ONCE
Status: COMPLETED | OUTPATIENT
Start: 2018-09-21 | End: 2018-09-21

## 2018-09-21 RX ORDER — PROCHLORPERAZINE MALEATE 5 MG
10 TABLET ORAL EVERY 6 HOURS PRN
Status: DISCONTINUED | OUTPATIENT
Start: 2018-09-21 | End: 2018-09-26 | Stop reason: HOSPADM

## 2018-09-21 RX ORDER — CEFOTETAN DISODIUM 1 G/10ML
1 INJECTION, POWDER, FOR SOLUTION INTRAMUSCULAR; INTRAVENOUS SEE ADMIN INSTRUCTIONS
Status: DISCONTINUED | OUTPATIENT
Start: 2018-09-21 | End: 2018-09-21 | Stop reason: HOSPADM

## 2018-09-21 RX ORDER — FENTANYL CITRATE 50 UG/ML
INJECTION, SOLUTION INTRAMUSCULAR; INTRAVENOUS PRN
Status: DISCONTINUED | OUTPATIENT
Start: 2018-09-21 | End: 2018-09-21

## 2018-09-21 RX ORDER — ONDANSETRON 2 MG/ML
4 INJECTION INTRAMUSCULAR; INTRAVENOUS EVERY 30 MIN PRN
Status: DISCONTINUED | OUTPATIENT
Start: 2018-09-21 | End: 2018-09-21 | Stop reason: HOSPADM

## 2018-09-21 RX ORDER — VECURONIUM BROMIDE 1 MG/ML
INJECTION, POWDER, LYOPHILIZED, FOR SOLUTION INTRAVENOUS PRN
Status: DISCONTINUED | OUTPATIENT
Start: 2018-09-21 | End: 2018-09-21

## 2018-09-21 RX ORDER — ONDANSETRON 2 MG/ML
4 INJECTION INTRAMUSCULAR; INTRAVENOUS EVERY 6 HOURS PRN
Status: DISCONTINUED | OUTPATIENT
Start: 2018-09-21 | End: 2018-09-26 | Stop reason: HOSPADM

## 2018-09-21 RX ORDER — NALOXONE HYDROCHLORIDE 0.4 MG/ML
.1-.4 INJECTION, SOLUTION INTRAMUSCULAR; INTRAVENOUS; SUBCUTANEOUS
Status: DISCONTINUED | OUTPATIENT
Start: 2018-09-21 | End: 2018-09-26 | Stop reason: HOSPADM

## 2018-09-21 RX ORDER — NEOSTIGMINE METHYLSULFATE 1 MG/ML
VIAL (ML) INJECTION PRN
Status: DISCONTINUED | OUTPATIENT
Start: 2018-09-21 | End: 2018-09-21

## 2018-09-21 RX ORDER — PROPOFOL 10 MG/ML
INJECTION, EMULSION INTRAVENOUS PRN
Status: DISCONTINUED | OUTPATIENT
Start: 2018-09-21 | End: 2018-09-21

## 2018-09-21 RX ORDER — DEXTROSE MONOHYDRATE 25 G/50ML
25-50 INJECTION, SOLUTION INTRAVENOUS
Status: DISCONTINUED | OUTPATIENT
Start: 2018-09-21 | End: 2018-09-26 | Stop reason: HOSPADM

## 2018-09-21 RX ADMIN — FENTANYL CITRATE 50 MCG: 50 INJECTION, SOLUTION INTRAMUSCULAR; INTRAVENOUS at 08:06

## 2018-09-21 RX ADMIN — PROPOFOL 70 MG: 10 INJECTION, EMULSION INTRAVENOUS at 07:44

## 2018-09-21 RX ADMIN — Medication 5 MG: at 07:57

## 2018-09-21 RX ADMIN — Medication 5 MG: at 07:51

## 2018-09-21 RX ADMIN — VECURONIUM BROMIDE 2 MG: 1 INJECTION, POWDER, LYOPHILIZED, FOR SOLUTION INTRAVENOUS at 08:00

## 2018-09-21 RX ADMIN — LIDOCAINE HYDROCHLORIDE 60 MG: 20 INJECTION, SOLUTION INFILTRATION; PERINEURAL at 07:44

## 2018-09-21 RX ADMIN — FENTANYL CITRATE 100 MCG: 50 INJECTION, SOLUTION INTRAMUSCULAR; INTRAVENOUS at 07:44

## 2018-09-21 RX ADMIN — I.V. FAT EMULSION 250 ML: 20 EMULSION INTRAVENOUS at 20:29

## 2018-09-21 RX ADMIN — CEFOTETAN DISODIUM 1 G: 1 INJECTION, POWDER, FOR SOLUTION INTRAMUSCULAR; INTRAVENOUS at 20:30

## 2018-09-21 RX ADMIN — NEOSTIGMINE METHYLSULFATE 3 MG: 1 INJECTION, SOLUTION INTRAVENOUS at 10:50

## 2018-09-21 RX ADMIN — VECURONIUM BROMIDE 2 MG: 1 INJECTION, POWDER, LYOPHILIZED, FOR SOLUTION INTRAVENOUS at 08:55

## 2018-09-21 RX ADMIN — SODIUM CHLORIDE, POTASSIUM CHLORIDE, SODIUM LACTATE AND CALCIUM CHLORIDE: 600; 310; 30; 20 INJECTION, SOLUTION INTRAVENOUS at 06:50

## 2018-09-21 RX ADMIN — ASCORBIC ACID, VITAMIN A PALMITATE, CHOLECALCIFEROL, THIAMINE HYDROCHLORIDE, RIBOFLAVIN-5 PHOSPHATE SODIUM, PYRIDOXINE HYDROCHLORIDE, NIACINAMIDE, DEXPANTHENOL, ALPHA-TOCOPHEROL ACETATE, VITAMIN K1, FOLIC ACID, BIOTIN, CYANOCOBALAMIN: 200; 3300; 200; 6; 3.6; 6; 40; 15; 10; 150; 600; 60; 5 INJECTION, SOLUTION INTRAVENOUS at 20:30

## 2018-09-21 RX ADMIN — SODIUM CHLORIDE, POTASSIUM CHLORIDE, SODIUM LACTATE AND CALCIUM CHLORIDE: 600; 310; 30; 20 INJECTION, SOLUTION INTRAVENOUS at 11:23

## 2018-09-21 RX ADMIN — VECURONIUM BROMIDE 2 MG: 1 INJECTION, POWDER, LYOPHILIZED, FOR SOLUTION INTRAVENOUS at 08:15

## 2018-09-21 RX ADMIN — VECURONIUM BROMIDE 2 MG: 1 INJECTION, POWDER, LYOPHILIZED, FOR SOLUTION INTRAVENOUS at 08:47

## 2018-09-21 RX ADMIN — Medication: at 12:01

## 2018-09-21 RX ADMIN — FENTANYL CITRATE 25 MCG: 50 INJECTION INTRAMUSCULAR; INTRAVENOUS at 11:14

## 2018-09-21 RX ADMIN — FENTANYL CITRATE 25 MCG: 50 INJECTION INTRAMUSCULAR; INTRAVENOUS at 11:21

## 2018-09-21 RX ADMIN — FENTANYL CITRATE 50 MCG: 50 INJECTION, SOLUTION INTRAMUSCULAR; INTRAVENOUS at 08:01

## 2018-09-21 RX ADMIN — ACETAMINOPHEN 975 MG: 325 TABLET, FILM COATED ORAL at 06:22

## 2018-09-21 RX ADMIN — PHENYLEPHRINE HYDROCHLORIDE 100 MCG: 10 INJECTION, SOLUTION INTRAMUSCULAR; INTRAVENOUS; SUBCUTANEOUS at 08:31

## 2018-09-21 RX ADMIN — SODIUM CHLORIDE, POTASSIUM CHLORIDE, SODIUM LACTATE AND CALCIUM CHLORIDE 1000 ML: 600; 310; 30; 20 INJECTION, SOLUTION INTRAVENOUS at 15:08

## 2018-09-21 RX ADMIN — FENTANYL CITRATE 50 MCG: 50 INJECTION, SOLUTION INTRAMUSCULAR; INTRAVENOUS at 08:04

## 2018-09-21 RX ADMIN — DEXMEDETOMIDINE HYDROCHLORIDE 8 MCG: 100 INJECTION, SOLUTION INTRAVENOUS at 10:33

## 2018-09-21 RX ADMIN — GLYCOPYRROLATE 0.4 MG: 0.2 INJECTION, SOLUTION INTRAMUSCULAR; INTRAVENOUS at 10:50

## 2018-09-21 RX ADMIN — DEXAMETHASONE SODIUM PHOSPHATE 4 MG: 4 INJECTION, SOLUTION INTRA-ARTICULAR; INTRALESIONAL; INTRAMUSCULAR; INTRAVENOUS; SOFT TISSUE at 07:54

## 2018-09-21 RX ADMIN — CEFOTETAN DISODIUM 1 G: 1 INJECTION, POWDER, FOR SOLUTION INTRAMUSCULAR; INTRAVENOUS at 07:48

## 2018-09-21 RX ADMIN — HYDROMORPHONE HYDROCHLORIDE 0.3 MG: 1 INJECTION, SOLUTION INTRAMUSCULAR; INTRAVENOUS; SUBCUTANEOUS at 10:29

## 2018-09-21 RX ADMIN — SODIUM CHLORIDE, POTASSIUM CHLORIDE, SODIUM LACTATE AND CALCIUM CHLORIDE: 600; 310; 30; 20 INJECTION, SOLUTION INTRAVENOUS at 08:22

## 2018-09-21 RX ADMIN — SODIUM CHLORIDE, POTASSIUM CHLORIDE, SODIUM LACTATE AND CALCIUM CHLORIDE: 600; 310; 30; 20 INJECTION, SOLUTION INTRAVENOUS at 09:28

## 2018-09-21 RX ADMIN — ALVIMOPAN 12 MG: 12 CAPSULE ORAL at 06:22

## 2018-09-21 RX ADMIN — PHENYLEPHRINE HYDROCHLORIDE 100 MCG: 10 INJECTION, SOLUTION INTRAMUSCULAR; INTRAVENOUS; SUBCUTANEOUS at 08:21

## 2018-09-21 RX ADMIN — MIDAZOLAM 1 MG: 1 INJECTION INTRAMUSCULAR; INTRAVENOUS at 07:38

## 2018-09-21 RX ADMIN — ONDANSETRON 4 MG: 2 INJECTION INTRAMUSCULAR; INTRAVENOUS at 09:47

## 2018-09-21 RX ADMIN — VECURONIUM BROMIDE 1 MG: 1 INJECTION, POWDER, LYOPHILIZED, FOR SOLUTION INTRAVENOUS at 10:34

## 2018-09-21 RX ADMIN — CELECOXIB 200 MG: 200 CAPSULE ORAL at 06:22

## 2018-09-21 RX ADMIN — Medication 5 MG: at 07:54

## 2018-09-21 RX ADMIN — PHENYLEPHRINE HYDROCHLORIDE 100 MCG: 10 INJECTION, SOLUTION INTRAMUSCULAR; INTRAVENOUS; SUBCUTANEOUS at 08:24

## 2018-09-21 RX ADMIN — Medication 0.5 MG: at 11:34

## 2018-09-21 RX ADMIN — HYDROMORPHONE HYDROCHLORIDE 0.2 MG: 1 INJECTION, SOLUTION INTRAMUSCULAR; INTRAVENOUS; SUBCUTANEOUS at 10:30

## 2018-09-21 RX ADMIN — ROCURONIUM BROMIDE 30 MG: 10 INJECTION INTRAVENOUS at 07:44

## 2018-09-21 RX ADMIN — DEXMEDETOMIDINE HYDROCHLORIDE 12 MCG: 100 INJECTION, SOLUTION INTRAVENOUS at 10:31

## 2018-09-21 ASSESSMENT — ENCOUNTER SYMPTOMS: SEIZURES: 0

## 2018-09-21 ASSESSMENT — ACTIVITIES OF DAILY LIVING (ADL)
COGNITION: 0 - NO COGNITION ISSUES REPORTED
RETIRED_EATING: 0-->INDEPENDENT
ADLS_ACUITY_SCORE: 10
ADLS_ACUITY_SCORE: 10

## 2018-09-21 ASSESSMENT — LIFESTYLE VARIABLES: TOBACCO_USE: 1

## 2018-09-21 NOTE — IP AVS SNAPSHOT
"[place] [address] [facility]: [phone]                                              INTERAGENCY TRANSFER FORM - PHYSICIAN ORDERS   2018                    Hospital Admission Date: 2018  SHAMIKA ENW   : 1955  Sex: Male        Attending Provider: Ace Harding MD     Allergies:  No Known Allergies    Infection:  None   Service:  SURGERY    Ht:  1.75 m (5' 8.9\")   Wt:  58.3 kg (128 lb 8.5 oz)   Admission Wt:  59 kg (130 lb)    BMI:  19.04 kg/m 2   BSA:  1.68 m 2            Patient PCP Information     Provider PCP Type    Adiel Meade MD General      ED Clinical Impression     Diagnosis Description Comment Added By Time Added    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 Please follow your organizational policy for patients in this situation.         Medication Review      START taking        Dose / Directions Comments    oxyCODONE IR 5 MG tablet   Commonly known as:  ROXICODONE   Used for:  Delta-enteric fistula        Dose:  5 mg   Take 1 tablet (5 mg) by mouth every 4 hours as needed for moderate to severe pain   Quantity:  20 tablet   Refills:  0          CONTINUE these medications which have NOT CHANGED        Dose / Directions Comments    cyanocobalamin 1000 MCG/ML injection   Commonly known as:  VITAMIN B12        Dose:  1 mL   Inject 1 mL into the muscle every 30 days   Refills:  0        ZOFRAN ODT PO        Dose:  4 mg   Take 4 mg by mouth 3 times daily as needed for nausea   Refills:  0                  Further instructions from your care team     "   [facility]  Discharge Instructions: Post-Operative Bowel Surgery    ACTIVITY    Expect to feel tired after your surgery.  This will gradually resolve.      Take frequent, short walks and increase your activity gradually.      Avoid strenuous physical activity or heavy lifting greater than 15 lbs. for 4 weeks.  You may climb stairs.      You may drive without restrictions when you are not using any prescription pain medication and feel comfortable in a car.    You may return to work/school when you are comfortable without any prescription pain medication.    You may wear an abdominal binder for comfort for 2-3 weeks from your surgery.  You can wash the abdominal binder and dry it on low heat in the dryer.    WOUND CARE    You may remove your outer dressing or Band-Aids and shower 48 hours after the surgery.  Pat your incisions dry and leave them open to air.  Re-apply dressing (Band-Aids or gauze/tape) as needed for comfort or drainage.    You may have steri-strips (looks like white tape) or staples at your incisions.  You may peel off the steri-strips 2 weeks after your surgery.  If you have staples, they will be removed at your next office visit.    Do not soak your incisions in a tub or pool for 2 weeks.     Do not apply any lotions, creams, or ointments to your incisions.    A ridge under your incisions is normal and will gradually resolve.    DIET    Stay on a low fiber diet until your follow up appointment.  Avoid heavy, spicy, greasy meals and gas forming foods, such as cabbage, broccoli, and onions.      You may find your appetite to be diminished briefly after surgery.  You may take nutritional supplement shakes if you are able.     Drink plenty of fluids to stay hydrated.    PAIN    Expect some tenderness and discomfort at the incision site(s).  Use the prescribed pain medication at your discretion.  Expect gradual resolution of your pain over several days.    You  may take ibuprofen with food (unless you have been told not to) instead of or in addition to your prescribed pain medication.  If you are taking Norco or Percocet, do not take any additional acetaminophen/APAP/Tylenol.    Do not drink alcohol or drive while you are taking pain medications.    You may apply ice to your incisions in 20 minute intervals as needed for the next 24-48 hours.  After that time, consider switching to heat if you prefer.    EXPECTATIONS    ***Pain medications can cause constipation.  Limit use when possible.  Take over the counter stool softener/stimulant, such as Colace or Senna, 1-2 times a day with plenty of water.  You may take a mild over the counter laxative, such as Miralax or a suppository, as needed.  You may discontinue these medications once you are having regular bowel movements and/or are no longer taking your narcotic pain medication.    For laparoscopic surgery, you may have shoulder or upper back discomfort due to the gas used in surgery.  This is temporary and should resolve in 48-72 hours.  Short, frequent walks may help with this.      RETURN APPOINTMENT    Follow up with your surgeon in ***10-14 days.  Please call our office at [phone] to schedule your appointment.  We are located at [address] [place] [address].    CALL OUR OFFICE -[phone] IF YOU HAVE:     Chills or fever above 101  F.    Increased redness, warmth, or drainage at your incisions.    Significant bleeding.    Pain not relieved by your pain medication or rest.    Increasing pain after the first 48 hours.    Any other concerns or questions.    Revised May 2018      Summary of Visit     Reason for your hospital stay       The patient was in the hospital to have surgery       Reason for your hospital stay       Repair of gastro-entero-colic fistula             After Care     Activity       Your activity upon discharge: No heavy lifting or straining over 15lbs for 6 weeks        Diet       Follow this diet upon discharge: Low fiber diet for 1 week and then resume regular diet       IV access       **Ordering Provider MUST call/page Care Coordinator/ to discuss arranging this service**    You are going home with the following vascular access device: PICC.             Referrals     Home infusion referral       Your provider has referred you to: [facility]: [facility] [phone]   [email or web address]  Resumption of TPN    Local Address (if different from home address): NA    Anticipated Length of Therapy: weeks    Home Infusion Pharmacist to adjust therapy based on labs and clinical assessments: Yes    Labs:  May draw labs from Venous Catheter: Yes  Home Infusion Pharmacist to order labs based on therapy type and clinical assessments: Yes  Call/Fax Lab Results to: [phone]    Agency Staff to assess nursing needs for Infusion Therapy.    Access Device Management:  IV Access Type: PICC  Flush with Heparin and Normal Saline IVP PRN and routine site care (per agency protocol) to maintain access device? Yes    Ok to be off TPN 9/26/18 for 3-4 hours until TPN supply arrives at home so patient can discharge to home sooner.       Home infusion referral       Your provider has referred you to: [facility]: [facility] [place] [phone]   [email or web address]    Anticipated Length of Therapy: 10-14 days    Home Infusion Pharmacist to adjust therapy based on labs and clinical assessments: Yes    Labs:  May draw labs from Venous Catheter: Yes  Home Infusion Pharmacist to order labs based on therapy type and clinical assessments: Yes    Agency Staff to assess nursing needs for Infusion Therapy.    Access Device Management:  IV Access Type: PICC  Flush with Heparin and Normal Saline IVP PRN and routine site care (per agency protocol) to maintain access device? Yes             Follow-Up  Appointment Instructions     Future Labs/Procedures    Follow-up and recommended labs and tests      Comments:    Follow up in the office for your post op visit with Dr. Andrews at 2:30pm on 10/29/18.    Follow-up and recommended labs and tests      Comments:    Follow up 2 weeks      Follow-Up Appointment Instructions     Follow-up and recommended labs and tests        Follow up in the office for your post op visit with Dr. Andrews at 2:30pm on 10/29/18.       Follow-up and recommended labs and tests        Follow up 2 weeks             Statement of Approval     Ordered          09/26/18 1505  I have reviewed and agree with all the recommendations and orders detailed in this document.     Approved and electronically signed by:  Ace Harding MD

## 2018-09-21 NOTE — PROGRESS NOTES
Patient completed PreOp antibiotics  Neomycin 1000mg tid (started 9/20/18 ended 9/20/18) Last dose 9/20/18 at 2300  Metronidazole 500mg tid (started 9/20/18 ended 9/20/18) Last dose 9/20/18 at 2300

## 2018-09-21 NOTE — OP NOTE
SURGEON: Ace Harding MD   ASSISTANT SURGEON: Momo Andrews MD.  FIRST ASSISTANT: Ryder Finn PA-C , The physicians assistant was medically necessary for their expertise in hemostasis, suctioning, suturing, and retraction.    PREOPERATIVE DIAGNOSIS: Gastro-enteric-colonic fistula.   POSTOPERATIVE DIAGNOSIS: Same, intra-abdominal adhesions.  OPERATIVE PROCEDURE:   1. Abdominal exploration with repair of gastro-enteric-colonic fistula.   2. Lysis of adhesions.   3. Partial gastric resection.  4. Small bowel resection     ANESTHESIA: General.   ESTIMATED BLOOD LOSS: Less than 30 mL.   EVENTS: After induction of general endotracheal anesthesia,Manuel Andre abdomen was prepped and draped in the usual sterile fashion. A secondary midline incision was made, and electrocautery dissection down to and through the abdominal wall fascia. Immediately upon entering the abdomen safely, we encountered some adhesions from the omentum to the abdominal wall. These were taken down carefully, and no evidence of injury noted.  We then noted the mass effect of the fistulized loops of bowel.  We then isolated the stomach which needed some dissection of the liver, transverse colon, and small bowel leading to the region of the fistulas.  The small intestine was evaluated distally all the way to the cecum and no other pathology noted.  It was clear he had distal gastrectomy with Billroth II reconstruction.  We then isolated all the intestines leading to this fistulous, Dr. Andrews then transected the transverse colon, see his dictation.  I then utilized CADY stapler to transect the stomach, and the afferent and efferent limbs of small bowel.  LigaSure device was utilized to take the mesentery.  Specimen was then sent to pathology.  After this was done Dr. Andrews completed the colocolonic anastomosis, again see his dictation.  I then performed Irvin-en-Y reconstruction by attaching the stomach to the jejunum using a combination of CADY and  TA stapler, this was followed by anastomosis of the biliopancreatic limb 60 cm distal to the gastrojejunostomy.  This was also done with CADY and TA staplers.  Mesenteric defect closed with silk suture.  Staple crossing lines were reinforced with 3-0 Vicryl.  Dissection bed and staple lines were coated with a evicel material.  The abdomen was copiously irrigated until effluent was clear and free of debris.  Nasogastric tube was verified to be in good position.  All anastomosis appeared patent and secured.  The abdominal wall fascia was closed with looped 0 PDS. Skin was approximated with 4-0 Monocryl. Steri-Strips and sterile dressings were applied. No immediate complications. All counts correct.     DEEPALI FELTON MD

## 2018-09-21 NOTE — BRIEF OP NOTE
[facility] Brief Operative Note    Pre-operative diagnosis: GASTROCOLIC FISTULA   Post-operative diagnosis same   Procedure: Procedure(s):  ABDOMINAL EXPLORATION, RESECTION OF GASTROCOLOENTERIC  FISTULA  - Wound Class: II-Clean Contaminated   - Wound Class: II-Clean Contaminated   Surgeon(s): Surgeon(s) and Role:     * Ace Harding MD - Primary     * Momo Andrews MD - Assisting     * Ryder Finn PA-C - Assisting   Estimated blood loss: 50cc   Specimens:   ID Type Source Tests Collected by Time Destination   A : gastro colo interic fistula Tissue Gastro Esophageal Junction SURGICAL PATHOLOGY EXAM Ace Harding MD 9/21/2018  8:56 AM       Findings: Specimen resected.   Sewn end to end colocolostomy  Stapled gastrojejunostomy with ~50cm limb to JJ.  No complications    Ryder Finn PA-C  Office: [phone]  Pager: [phone]

## 2018-09-21 NOTE — IP AVS SNAPSHOT
MRN:[id number]                      After Visit Summary   9/21/2018    Manuel Andre    MRN: [id number]           Thank you!     Thank you for choosing [facility] for your care. Our goal is always to provide you with excellent care. Hearing back from our patients is one way we can continue to improve our services. Please take a few minutes to complete the written survey that you may receive in the mail after you visit with us. Thank you!        Patient Information     Date Of Birth          1955        Designated Caregiver       Most Recent Value    Caregiver    Will someone help with your care after discharge? yes    Name of designated caregiver Marisabel- daughter    Phone number of caregiver [phone]-cell    Caregiver address [address]      About your hospital stay     You were admitted on:  September 21, 2018 You last received care in the:  [place] [address] [facility]    You were discharged on:  September 26, 2018        Reason for your hospital stay       The patient was in the hospital to have surgery            Reason for your hospital stay       Repair of gastro-entero-colic fistula                  Who to Call     For medical emergencies, please call 911.  For non-urgent questions about your medical care, please call your primary care provider or clinic, [phone]  For questions related to your surgery, please call your surgery clinic        Attending Provider     Provider Specialty    Ace Harding MD Surgery       Primary Care Provider Office Phone # Fax #    Adiel Meade [phone]      After Care Instructions     Activity       Your activity upon discharge: No heavy lifting or straining over 15lbs for 6 weeks            Diet       Follow this diet upon discharge: Low fiber diet for 1 week and then resume regular diet            IV access       **Ordering Provider MUST call/page Care Coordinator/ to discuss arranging this  service**    You are going home with the following vascular access device: PICC.                  Follow-up Appointments     Follow-up and recommended labs and tests        Follow up in the office for your post op visit with Dr. Andrews at 2:30pm on 10/29/18.            Follow-up and recommended labs and tests        Follow up 2 weeks                  Additional Services     Home infusion referral       Your provider has referred you to: G: [facility] [phone]   [email or web address]  Resumption of TPN    Local Address (if different from home address): NA    Anticipated Length of Therapy: weeks    Home Infusion Pharmacist to adjust therapy based on labs and clinical assessments: Yes    Labs:  May draw labs from Venous Catheter: Yes  Home Infusion Pharmacist to order labs based on therapy type and clinical assessments: Yes  Call/Fax Lab Results to: [phone]    Agency Staff to assess nursing needs for Infusion Therapy.    Access Device Management:  IV Access Type: PICC  Flush with Heparin and Normal Saline IVP PRN and routine site care (per agency protocol) to maintain access device? Yes    Ok to be off TPN 9/26/18 for 3-4 hours until TPN supply arrives at home so patient can discharge to home sooner.            Home infusion referral       Your provider has referred you to: [facility]: [facility] [phone]   [email or web address]    Anticipated Length of Therapy: 10-14 days    Home Infusion Pharmacist to adjust therapy based on labs and clinical assessments: Yes    Labs:  May draw labs from Venous Catheter: Yes  Home Infusion Pharmacist to order labs based on therapy type and clinical assessments: Yes    Agency Staff to assess nursing needs for Infusion Therapy.    Access Device Management:  IV Access Type: PICC  Flush with Heparin and Normal Saline IVP PRN and routine site care (per agency  protocol) to maintain access device? Yes                  Further instructions from your care team       [facility]  Discharge Instructions: Post-Operative Bowel Surgery    ACTIVITY    Expect to feel tired after your surgery.  This will gradually resolve.      Take frequent, short walks and increase your activity gradually.      Avoid strenuous physical activity or heavy lifting greater than 15 lbs. for 4 weeks.  You may climb stairs.      You may drive without restrictions when you are not using any prescription pain medication and feel comfortable in a car.    You may return to work/school when you are comfortable without any prescription pain medication.    You may wear an abdominal binder for comfort for 2-3 weeks from your surgery.  You can wash the abdominal binder and dry it on low heat in the dryer.    WOUND CARE    You may remove your outer dressing or Band-Aids and shower 48 hours after the surgery.  Pat your incisions dry and leave them open to air.  Re-apply dressing (Band-Aids or gauze/tape) as needed for comfort or drainage.    You may have steri-strips (looks like white tape) or staples at your incisions.  You may peel off the steri-strips 2 weeks after your surgery.  If you have staples, they will be removed at your next office visit.    Do not soak your incisions in a tub or pool for 2 weeks.     Do not apply any lotions, creams, or ointments to your incisions.    A ridge under your incisions is normal and will gradually resolve.    DIET    Stay on a low fiber diet until your follow up appointment.  Avoid heavy, spicy, greasy meals and gas forming foods, such as cabbage, broccoli, and onions.      You may find your appetite to be diminished briefly after surgery.  You may take nutritional supplement shakes if you are able.     Drink plenty of fluids to stay hydrated.    PAIN    Expect some tenderness and discomfort at the incision site(s).  Use the prescribed  pain medication at your discretion.  Expect gradual resolution of your pain over several days.    You may take ibuprofen with food (unless you have been told not to) instead of or in addition to your prescribed pain medication.  If you are taking Norco or Percocet, do not take any additional acetaminophen/APAP/Tylenol.    Do not drink alcohol or drive while you are taking pain medications.    You may apply ice to your incisions in 20 minute intervals as needed for the next 24-48 hours.  After that time, consider switching to heat if you prefer.    EXPECTATIONS    ***Pain medications can cause constipation.  Limit use when possible.  Take over the counter stool softener/stimulant, such as Colace or Senna, 1-2 times a day with plenty of water.  You may take a mild over the counter laxative, such as Miralax or a suppository, as needed.  You may discontinue these medications once you are having regular bowel movements and/or are no longer taking your narcotic pain medication.    For laparoscopic surgery, you may have shoulder or upper back discomfort due to the gas used in surgery.  This is temporary and should resolve in 48-72 hours.  Short, frequent walks may help with this.      RETURN APPOINTMENT    Follow up with your surgeon in ***10-14 days.  Please call our office at [phone] to schedule your appointment.  We are located at [address] [place] [address].    CALL OUR OFFICE -[phone] IF YOU HAVE:     Chills or fever above 101  F.    Increased redness, warmth, or drainage at your incisions.    Significant bleeding.    Pain not relieved by your pain medication or rest.    Increasing pain after the first 48 hours.    Any other concerns or questions.    Revised May 2018      Pending Results     No orders found from 9/19/2018 to 9/22/2018.            Statement of Approval     Ordered          09/26/18 1505  I have reviewed and agree with all the recommendations and orders detailed in  "this document.     Approved and electronically signed by:  Ace Harding MD             Admission Information     Date & Time Provider Department Dept. Phone    9/21/2018 Ace Harding MD [place] [address] [facility] [phone]      Your Vitals Were     Blood Pressure Pulse Temperature Respirations Height Weight    97/62 (BP Location: Left arm) 76 99.3  F (37.4  C) (Oral) 18 1.75 m (5' 8.9\") 58.3 kg (128 lb 8.5 oz)    Pulse Oximetry BMI (Body Mass Index)                97% 19.04 kg/m2          Care EveryWhere ID     This is your Care EveryWhere ID. This could be used by other organizations to access your [facility] medical records  [id number]        Equal Access to Services     CIELO MALDONADO : Everardo Rico, waaxda luqadaha, qaybta kaalmada marlenyasrikanth, lola mistry. So [facility] [phone].    ATENCIÓN: Si habla español, tiene a juarez disposición servicios gratuitos de asistencia lingüística. Llame al [phone].    We comply with applicable federal civil rights laws and [place] laws. We do not discriminate on the basis of race, color, national origin, age, disability, sex, sexual orientation, or gender identity.               Review of your medicines      START taking        Dose / Directions    oxyCODONE IR 5 MG tablet   Commonly known as:  ROXICODONE   Used for:  Ava-enteric fistula        Dose:  5 mg   Take 1 tablet (5 mg) by mouth every 4 hours as needed for moderate to severe pain   Quantity:  20 tablet   Refills:  0         CONTINUE these medicines which have NOT CHANGED        Dose / Directions    cyanocobalamin 1000 MCG/ML injection   Commonly known as:  VITAMIN B12        Dose:  1 mL   Inject 1 mL into the muscle every 30 days   Refills:  0       ZOFRAN ODT PO        Dose:  4 mg   Take 4 mg by mouth 3 times daily as needed for nausea   Refills:  0            Where to get your medicines      Some of these will need a paper prescription and " others can be bought over the counter. Ask your nurse if you have questions.     Bring a paper prescription for each of these medications     oxyCODONE IR 5 MG tablet                Protect others around you: Learn how to safely use, store and throw away your medicines at www.disposemymeds.org.        Information about OPIOIDS     PRESCRIPTION OPIOIDS: WHAT YOU NEED TO KNOW   We gave you an opioid (narcotic) pain medicine. It is important to manage your pain, but opioids are not always the best choice. You should first try all the other options your care team gave you. Take this medicine for as short a time (and as few doses) as possible.    Some activities can increase your pain, such as bandage changes or therapy sessions. It may help to take your pain medicine 30 to 60 minutes before these activities. Reduce your stress by getting enough sleep, working on hobbies you enjoy and practicing relaxation or meditation. Talk to your care team about ways to manage your pain beyond prescription opioids.    These medicines have risks:    DO NOT drive when on new or higher doses of pain medicine. These medicines can affect your alertness and reaction times, and you could be arrested for driving under the influence (DUI). If you need to use opioids long-term, talk to your care team about driving.    DO NOT operate heavy machinery    DO NOT do any other dangerous activities while taking these medicines.    DO NOT drink any alcohol while taking these medicines.     If the opioid prescribed includes acetaminophen, DO NOT take with any other medicines that contain acetaminophen. Read all labels carefully. Look for the word  acetaminophen  or  Tylenol.  Ask your pharmacist if you have questions or are unsure.    You can get addicted to pain medicines, especially if you have a history of addiction (chemical, alcohol or substance dependence). Talk to your care team about ways to reduce this risk.    All opioids tend to cause  constipation. Drink plenty of water and eat foods that have a lot of fiber, such as fruits, vegetables, prune juice, apple juice and high-fiber cereal. Take a laxative (Miralax, milk of magnesia, Colace, Senna) if you don t move your bowels at least every other day. Other side effects include upset stomach, sleepiness, dizziness, throwing up, tolerance (needing more of the medicine to have the same effect), physical dependence and slowed breathing.    Store your pills in a secure place, locked if possible. We will not replace any lost or stolen medicine. If you don t finish your medicine, please throw away (dispose) as directed by your pharmacist. The [place] Pollution Control [employer] has more information about safe disposal: https://www.pca.[facility].[place].us/living-green/managing-unwanted-medications             Medication List: This is a list of all your medications and when to take them. Check marks below indicate your daily home schedule. Keep this list as a reference.      Medications           Morning Afternoon Evening Bedtime As Needed    cyanocobalamin 1000 MCG/ML injection   Commonly known as:  VITAMIN B12   Inject 1 mL into the muscle every 30 days                                   oxyCODONE IR 5 MG tablet   Commonly known as:  ROXICODONE   Take 1 tablet (5 mg) by mouth every 4 hours as needed for moderate to severe pain   Last time this was given:  5 mg on 9/26/2018  3:56 AM                                   ZOFRAN ODT PO   Take 4 mg by mouth 3 times daily as needed for nausea

## 2018-09-21 NOTE — OP NOTE
Procedure Date: 09/21/2018      DATE OF SERVICE 9/21/2018.      PREOPERATIVE DIAGNOSIS:  Gastrocolic fistula.      POSTOPERATIVE DIAGNOSIS:  Gastroenterocolic fistula.      PROCEDURES PERFORMED:  Exploratory laparotomy, transverse colectomy.      STAFF SURGEON:  Ace Harding MD.        CO-SURGEON:  Momo Andrews MD.        ASSISTANT:  Ryder Finn PA-C.        ANESTHESIA:  General.      ESTIMATED BLOOD LOSS:  50 mL.      SPECIMENS:  Gastrocolic enteric fistula.      FINDINGS:  The patient has a history of a partial distal gastrectomy with a loop gastrojejunostomy.  The fistula was essentially at the anastomosis fistulizing to the transverse colon.  We, therefore, resected the distal stomach, small bowel and transverse colon.  The colon was reconstructed with an end to end handsewn 2-layer anastomosis.  The stomach and small bowel was then reconstructed in a Irvin-en-Y fashion with a gastrojejunostomy with a 50 cm Irvin limb to a jejunojejunostomy.      INDICATIONS:  Please see Dr. Harding's note for details regarding the partial gastrectomy and small bowel resection as well as the foregut reconstruction.      INDICATIONS:  Manuel is a 63-year-old gentleman with a history of ulcer disease treated with a partial gastrectomy in [facility] many years ago.  He then developed diarrhea.  He was referred to gastroenterology who performed a colonoscopy and there was a possible mass in the transverse colon and the scope was incomplete.  The patient was referred to me for evaluation.  I repeated his colonoscopy and was able to complete the colonoscopy to the cecum.  In the area of the transverse colon, there was a tattoo with an ulcer was noted and a fistula was noted.  I was able to intubate the fistula and pass the scope quite a ways into the small bowel.  The small bowel and the ulcer as well as the fistula were biopsied and these were benign.  The patient then underwent an EGD, a Gastrografin enema as well as an upper GI small  bowel follow-through.  The EGD demonstrated a gastrojejunal anastomosis.  The upper GI small bowel follow-through and the Gastrografin enema demonstrated a gastroenterocolic fistula.  I referred the patient to Dr. Harding for assistance with this case given the involvement of the fistula to the foregut.  I reviewed the risks of surgery with the patient including the risk of bleeding, infection, anastomotic leak, injury to adjacent structures, need for further surgery, need for a possible stoma, urinary tract infection, DVT, complication of the anesthesia and even more serious complications were discussed.  He wishes to proceed.      DESCRIPTION OF PROCEDURE:  After obtaining informed consent, the patient was brought to the operating room.  He went to full bowel prep preoperatively.  General anesthesia was induced.  He was intubated by the anesthesia service without difficulty.  His right arm was tucked to the side.  A Fong catheter was placed under sterile conditions.  His left arm was out on an arm board.  The abdomen shaved, prepped and draped in the usual sterile fashion.  A timeout was taken which identified the patient and the correct procedure.  Dr. Harding and I began by performing a laparotomy through an upper midline incision.  Dissection was taken down with electrocautery to below the umbilicus and to just below the xiphoid process.  We used a Bookwalter retractor for excellent exposure during the entire case.  There were some flimsy adhesions to the anterior abdominal wall of the omentum.  Once this was identified, we could identify the transverse colon and the small bowel.  We could identify the stomach coming over the colon and identified the anastomosis and the fistula seemed to be right at the anastomosis.  We then ran the small bowel from the ligament of Treitz to the terminal ileum and identified that the small bowel came up as a loop gastrojejunostomy to the antecolic gastrojejunostomy.  The rest of  the small bowel was tightly normal down to the ligament of Treitz.  Dr. Harding and I then dissected the small bowel off of the transverse colon and off the transverse mesocolon.  We then mobilized the transverse colon by incising the attachments of the omentum to the transverse colon and taking it off the liver.  We then opened up the lesser sac by incising the attachments of the gastrocolic ligament.  This allowed Dr. Harding to mobilized the stomach up off the liver.  With these maneuvers, we had isolated the small bowel, mobilized the colon and isolated the stomach proximal to the fistula.  I then isolated the colon distal to the fistula with electrocautery.  The bowel was then transected with a single firing of a blue load CADY stapler.  Dr. Harding then transected the stomach with a single firing of a green load CADY stapler just proximal to the fistula as well.  He similarly divided the small bowel proximally and distally to the fistula by isolating the bowel with electrocautery and transecting the bowel sequentially with two firings of blue load Endo-CADY stapler.  The intervening mesentery of the small bowel was taken with the LigaSure device.  I then took the transverse mesocolon  with the LigaSure device fairly close to the bowel wall.  As we palpated this area, we did not feel any mass or previously identified any malignancy.  I then transected the transverse colon a couple centimeters proximal to the fistula with a single firing of a blue load CADY stapler.  Specimen was then opened up on the back table and we changed our gloves after examining the specimen.  The ulcer was at the anastomosis but no mass could be identified.  The specimen was sent off as gastroenterocolic fistula.  We then inspected everything for hemostasis.  Given the good quality of his tissues, we elected to proceed with an anastomosis without any stoma.  Our plan is to perform an antecolic foregut reconstruction.  I, therefore, began with  an end-to-end colocolonic handsewn anastomosis.  This was done in two layers with interrupted 3-0 Vicryl sutures placed along the posterior wall after the staple lines were excised and the bowel lined up for anastomosis.  The inner layer was then performed with 2 running sutures of 2-0 Vicryl.  The anterior wall of the second layer was then performed with interrupted 3-0 Vicryl sutures in a Lembert fashion.  The anastomosis was found to be widely patent, well vascularized and without any tension.  I then closed the mesenteric defect of the mesocolon with a running suture of 3-0 Vicryl.      Dr. Harding then performed the foregut reconstruction in a Irvin-en-Y fashion with a gastrojejunostomy followed by a jejunojejunostomy.  Please see his note for full details regarding this.  The abdomen was then copiously irrigated.  Hemostasis was assured.  We closed the abdomen with 2 running sutures of looped 0 PDS.  Skin and subcutaneous fat was irrigated and closed with 4-0 Monocryl in a subcuticular fashion.  Steri-Strips were placed as a dressing.  A nylon was placed as a dressing.  The Fong catheter was left in place and NG tube was secured in position.  Its position was verified  intraoperatively and great care was taken to avoid any stapling of the nasogastric tube during the gastric resection.  The patient was awakened, extubated and transferred to the PACU in stable condition.  Lap, sponge and needle counts were correct at the end of the case x2.         JOHNATHAN ROSALES MD             D: 2018   T: 2018   MT: JOSE MIGUEL      Name:     SHAMIKA NEW   MRN:      [id number]        Account:        [id number]   :      1955           Procedure Date: 2018      Document: [id number]       cc: [facility]        Adiel Nunez MD

## 2018-09-21 NOTE — ANESTHESIA CARE TRANSFER NOTE
Patient: Manuel Andre    Procedure(s):  ABDOMINAL EXPLORATION, BOWEL RESECTION REPAIR OF GASTRICCOLOIC  FISTULA  - Wound Class: II-Clean Contaminated   - Wound Class: II-Clean Contaminated    Diagnosis: GASTROCOLIC FISTULA  Diagnosis Additional Information: No value filed.    Anesthesia Type:   General, ETT     Note:  Airway :Face Mask  Patient transferred to:PACU  Comments: Pt exhibits spontaneous respirations, follows commands, suctioned, extubated, dentition unchanged, exchanging well, transferred to pacu with 10L O2 via mask, all monitors and alarms on, report to Caitlyn HARRIS Report: Identifed the Patient, Identified the Reponsible Provider, Reviewed the pertinent medical history, Discussed the surgical course, Reviewed Intra-OP anesthesia mangement and issues during anesthesia, Set expectations for post-procedure period and Allowed opportunity for questions and acknowledgement of understanding      Vitals: (Last set prior to Anesthesia Care Transfer)    CRNA VITALS  9/21/2018 1027 - 9/21/2018 1108      9/21/2018             Pulse: 107    SpO2: 100 %    Resp Rate (observed): 10    Resp Rate (set): 10                Electronically Signed By: MARIKA Wilder CRNA  September 21, 2018  11:08 AM

## 2018-09-21 NOTE — ANESTHESIA POSTPROCEDURE EVALUATION
Patient: Manuel Andre    Procedure(s):  ABDOMINAL EXPLORATION, BOWEL RESECTION REPAIR OF GASTRICCOLOIC  FISTULA  - Wound Class: II-Clean Contaminated   - Wound Class: II-Clean Contaminated    Diagnosis:GASTROCOLIC FISTULA  Diagnosis Additional Information: No value filed.    Anesthesia Type:  General, ETT    Note:  Anesthesia Post Evaluation    Patient location during evaluation: PACU  Patient participation: Able to fully participate in evaluation  Level of consciousness: awake and alert  Pain management: satisfactory to patient  Airway patency: patent  Cardiovascular status: hemodynamically stable  Respiratory status: acceptable and unassisted  Hydration status: balanced  PONV: none     Anesthetic complications: None          Last vitals:  Vitals:    09/21/18 1233 09/21/18 1300 09/21/18 1537   BP:  109/71    Pulse:      Resp:  12    Temp:  36.4  C (97.5  F) 36.4  C (97.5  F)   SpO2: 98% 97%          Electronically Signed By: Pankaj Valdovinos MD  September 21, 2018  4:37 PM

## 2018-09-21 NOTE — CONSULTS
CLINICAL NUTRITION SERVICES  -  ASSESSMENT NOTE      Recommendations Ordered by Registered Dietitian (RD):   Step 1:  Begin TPN D15 AA5 at 40 mL/hr + Lipids 250 mL every other day = 930 kcals (16 kcal/kg), 48 gm pro (0.8 gm/kg), 144 gm CHO, 27% fat.  Decrease IVF to 60 mL/hr.    After 24 hrs, if labs acceptable (K, Mg, Phos), increase TPN D15 AA5 to 65 mL/hr + 250 mL Lipid 5x/week (Mon-Fri) = 1465 kcals (25 kcal/kg), 78 gm pro (1.3 gm/kg), 24% fat  Decrease IVF to 35 mL/hr.    After 24 hrs, if labs acceptable (K, Mg, Phos), increase TPN D15 AA5 to goal 90 mL/hr + 250 mL Lipid daily = 2035 kcals (34 kcal/kg and 99% energy needs), 108 gm pro (1.8 gm/kg), 324 gm CHO, 25% fat.  Decrease IVF to 10 mL/hr.   Malnutrition:   % Weight Loss:  > 10% in 6 months (severe malnutrition)  % Intake:  No decreased intake noted, but question whether nutrients consumed were being absorbed due to chronic diarrhea  Subcutaneous Fat Loss:  Orbital region moderate depletion and Upper arm region moderate depletion  Muscle Loss:  Temporal region moderate depletion and Clavicle bone region severe depletion  Fluid Retention:  Moderate - Likely nutrition related    Malnutrition Diagnosis: Severe malnutrition  In Context of:  Acute illness or injury  Chronic illness or disease        REASON FOR ASSESSMENT  Manuel Andre is a 63 year old male seen by Registered Dietitian for Pharmacy/Nutrition to Start and Manage PN      NUTRITION HISTORY  - Information obtained from daughter Marisabel (as pt was sleeping soundly this pm) and EPIC records.  - Patient is on a regular diet at home.  - Typical food/fluid intake is good.  - Diet history:  Pt lives alone and does his own shopping and cooking. He has been eating large volumes of food in efforts to gain weight.  For breakfast he'll have as many as 3 egg sandwiches.  For lunch he eats a foot long sub sandwich.  People at [facility] work place are amazed by the amount of food he eats.  He had 2/3 of his stomach  "removed in 1987.  He's had chronic diarrhea since early part of this year (4-8 stools per day).  - Supplements:  Ensure and Premier Protein (likes chocolate).  MD suggested he try the lower sugar supplement.   - No food allergies or intolerances.  He doesn't care for milk to drink but likes dairy products.  Daughter feels he's lost weight \"all over.\"        CURRENT NUTRITION ORDERS  Diet Order:     NPO   Was asked to initiate TPN with Pharmacist (central line in place).  Indication:  Not tolerating EN and weight loss.    Current Intake/Tolerance:  N/a    9/21:  Surgery - Abdominal exploration, resection of gastrocoloenteric fistula    PHYSICAL FINDINGS  Observed  Muscle Wasting - temporal, clavicle  Subcutaneous fat loss - orbital, arms  Obtained from Chart/Interdisciplinary Team  Muscle Wasting and Subcutaneous fat loss - frail, thin, suspect malnutrition  Edema - h/o recent 3+ BLE which had been improving as of late    ANTHROPOMETRICS  Height: 5' 8.898\"  Weight: 130 lbs 0 oz  Body mass index is 19.25 kg/(m^2).  Weight Status:  Normal BMI (however, the edema will mask a true lower BMI)  IBW:  72.7 kg  % IBW:  81%  Weight History:  Daughter reports 30 lb (19%) weight loss over the past 6 months.    Wt Readings from Last 20 Encounters:   09/21/18 59 kg (130 lb)   08/06/18 63.5 kg (140 lb)   06/19/18 63.5 kg (140 lb)       LABS  Labs pending    MEDICATIONS  Medications reviewed  IVF LR at 100 mL/hr - for fluid delivery      ASSESSED NUTRITION NEEDS PER APPROVED PRACTICE GUIDELINES:    Dosing Weight:  59 kg (admit wt)   Estimated Energy Needs: (3 steps due to risk for refeeding syndrome)  Step 1:  900-1180 kcals (15-20 kcal/kg)  Step 2:  3975-5697 kcals (25-30 kcal/kg)  Step 3:  0222-1251 kcals (35-40 kcal/kg)  Justification: repletion, post-op and underweight  Estimated Protein Needs:   grams protein (1.5-2 g pro/Kg)  Justification: Repletion and post-op  Estimated Fluid Needs:  6694-7627 mL (1 " mL/Kcal)  Justification: maintenance    MALNUTRITION:  % Weight Loss:  > 10% in 6 months (severe malnutrition)  % Intake:  No decreased intake noted, but question whether nutrients consumed were being absorbed due to chronic diarrhea  Subcutaneous Fat Loss:  Orbital region moderate depletion and Upper arm region moderate depletion  Muscle Loss:  Temporal region moderate depletion and Clavicle bone region severe depletion  Fluid Retention:  Mild-Moderate suspected- Likely nutrition related    Malnutrition Diagnosis: Severe malnutrition  In Context of:  Acute illness or injury  Chronic illness or disease    NUTRITION DIAGNOSIS:  Malnutrition related to altered GI function as evidenced by chronic diarrhea, 19% weight loss over past 6 months, recent presence of edema, and loss of fat and muscle stores.      NUTRITION INTERVENTIONS  Recommendations / Nutrition Prescription  Step 1:  Begin TPN D15 AA5 at 40 mL/hr + Lipids 250 mL every other day = 930 kcals (16 kcal/kg), 48 gm pro (0.8 gm/kg), 144 gm CHO, 27% fat.  Decrease IVF to 60 mL/hr.    After 24 hrs, if labs acceptable (K, Mg, Phos), increase TPN D15 AA5 to 65 mL/hr + 250 mL Lipid 5x/week (Mon-Fri) = 1465 kcals (25 kcal/kg), 78 gm pro (1.3 gm/kg), 24% fat  Decrease IVF to 35 mL/hr.    After 24 hrs, if labs acceptable (K, Mg, Phos), increase TPN D15 AA5 to goal 90 mL/hr + 250 mL Lipid daily = 2035 kcals (34 kcal/kg and 99% energy needs), 108 gm pro (1.8 gm/kg), 324 gm CHO, 25% fat.  Decrease IVF to 10 mL/hr.    Implementation  Nutrition education: Provided education to daughter on TPN process and components.  Discussed use of future oral liquid nutritional supplements.  Collaboration and Referral of Nutrition care - Discussed with Pharmacist the plan to use Clinimix (at least for 1st bag;  Labs still pending)  PN Composition, PN Schedule - Entered above TPN orders in EPIC      Nutrition Goals  Pt will tolerate TPN without refeeding syndrome      MONITORING AND  EVALUATION:  Progress towards goals will be monitored and evaluated per protocol and Practice Guidelines    Cathy Ortega RD, LD, CNSC

## 2018-09-21 NOTE — IP AVS SNAPSHOT
[facility] [address] [facility]    [address] [place] [address]    Phone:  [phone]                                       After Visit Summary   9/21/2018    Manuel Andre    MRN: [id number]           After Visit Summary Signature Page     I have received my discharge instructions, and my questions have been answered. I have discussed any challenges I see with this plan with the nurse or doctor.    ..........................................................................................................................................  Patient/Patient Representative Signature      ..........................................................................................................................................  Patient Representative Print Name and Relationship to Patient    ..................................................               ................................................  Date                                   Time    ..........................................................................................................................................  Reviewed by Signature/Title    ...................................................              ..............................................  Date                                               Time          22EPIC Rev 08/18

## 2018-09-21 NOTE — ANESTHESIA PREPROCEDURE EVALUATION
Anesthesia Evaluation     . Pt has had prior anesthetic.     No history of anesthetic complications          ROS/MED HX    ENT/Pulmonary:     (+)tobacco use, , . .   (-) sleep apnea   Neurologic:      (-) seizures and CVA   Cardiovascular:        (-) hypertension   METS/Exercise Tolerance:     Hematologic:     (+) Anemia, -      Musculoskeletal:         GI/Hepatic: Comment: GI fistula   malnutrition        (-) GERD and liver disease   Renal/Genitourinary:      (-) renal disease   Endo:      (-) Type II DM and thyroid disease   Psychiatric:         Infectious Disease:         Malignancy:         Other:                     Physical Exam  Normal systems: cardiovascular and pulmonary    Airway   Mallampati: III  TM distance: >3 FB  Neck ROM: full    Dental   (+) upper dentures    Cardiovascular       Pulmonary                     Anesthesia Plan      History & Physical Review  History and physical reviewed and following examination; no interval change.    ASA Status:  2 .    NPO Status:  > 8 hours    Plan for General and ETT with Intravenous induction. Maintenance will be Balanced.    PONV prophylaxis:  Ondansetron (or other 5HT-3) and Dexamethasone or Solumedrol       Postoperative Care  Postoperative pain management:  Multi-modal analgesia.      Consents  Anesthetic plan, risks, benefits and alternatives discussed with:  Patient..        Procedure: Procedure(s):  COLECTOMY WITHOUT COLOSTOMY  LAPAROTOMY EXPLORATORY  Preop diagnosis: GASTROCOLIC FISTULA    No Known Allergies  Past Medical History:   Diagnosis Date     Anemia      [place]-enteric fistula      History of blood transfusion      Lung nodule      Past Surgical History:   Procedure Laterality Date     ABDOMEN SURGERY      2/3 of stomach removed 1987     COLONOSCOPY N/A 6/19/2018    Procedure: COMBINED COLONOSCOPY, SINGLE OR MULTIPLE BIOPSY/POLYPECTOMY BY BIOPSY;;  Surgeon: Momo Andrews MD;  Location:  GI     HERNIA REPAIR       STOMACH SURGERY      for   ulcer     Prior to Admission medications    Medication Sig Start Date End Date Taking? Authorizing Provider   cyanocobalamin (VITAMIN B12) 1000 MCG/ML injection Inject 1 mL into the muscle every 30 days   Yes Reported, Patient   Ondansetron (ZOFRAN ODT PO) Take 4 mg by mouth 3 times daily as needed for nausea   Yes Reported, Patient     Current Facility-Administered Medications Ordered in Epic   Medication Dose Route Frequency Last Rate Last Dose     acetaminophen (TYLENOL) tablet 975 mg  975 mg Oral Once         cefoTEtan (CEFOTAN) 1 g vial to attach to  ml bag  1 g Intravenous Pre-Op/Pre-procedure x 1 dose         cefoTEtan (CEFOTAN) 1 g vial to attach to  ml bag  1 g Intravenous See Admin Instructions         Patient RECEIVING antibiotic to treat a different condition and it provides ADEQUATE COVERAGE for this surgical procedure.  1 each As instructed Continuous         No current [facility]-ordered outpatient prescriptions on file.     Wt Readings from Last 1 Encounters:   09/21/18 59 kg (130 lb)     Temp Readings from Last 1 Encounters:   09/21/18 36.4  C (97.6  F) (Temporal)     BP Readings from Last 6 Encounters:   09/21/18 95/61   08/06/18 124/84   06/19/18 113/82     Pulse Readings from Last 4 Encounters:   09/21/18 58   08/06/18 74     Resp Readings from Last 1 Encounters:   09/21/18 17     SpO2 Readings from Last 1 Encounters:   09/21/18 99%     Recent Labs   Lab Test  09/17/18 2000   NA  141   POTASSIUM  4.5   CHLORIDE  112*   CO2  21   ANIONGAP  8   GLC  87   BUN  11   CR  0.63*   LANCE  7.9*     Recent Labs   Lab Test  09/17/18 2000   WBC  8.3   HGB  10.0*   PLT  263     No results for input(s): INR in the last 92769 hours.    Invalid input(s): APTT   RECENT LABS:   ECG:   ECHO:   CXR:                        .

## 2018-09-22 LAB
ALBUMIN SERPL-MCNC: NORMAL G/DL (ref 3.4–5)
ALP SERPL-CCNC: NORMAL U/L (ref 40–150)
ALT SERPL W P-5'-P-CCNC: NORMAL U/L (ref 0–70)
ANION GAP SERPL CALCULATED.3IONS-SCNC: 4 MMOL/L (ref 3–14)
ANION GAP SERPL CALCULATED.3IONS-SCNC: NORMAL MMOL/L (ref 6–17)
AST SERPL W P-5'-P-CCNC: NORMAL U/L (ref 0–45)
BASOPHILS # BLD AUTO: 0 10E9/L (ref 0–0.2)
BASOPHILS NFR BLD AUTO: 0.2 %
BILIRUB DIRECT SERPL-MCNC: NORMAL MG/DL (ref 0–0.2)
BILIRUB SERPL-MCNC: NORMAL MG/DL (ref 0.2–1.3)
BUN SERPL-MCNC: 6 MG/DL (ref 7–30)
BUN SERPL-MCNC: NORMAL MG/DL (ref 7–30)
CALCIUM SERPL-MCNC: 7.6 MG/DL (ref 8.5–10.1)
CALCIUM SERPL-MCNC: NORMAL MG/DL (ref 8.5–10.1)
CHLORIDE SERPL-SCNC: 106 MMOL/L (ref 94–109)
CHLORIDE SERPL-SCNC: NORMAL MMOL/L (ref 94–109)
CHOLEST SERPL-MCNC: 115 MG/DL
CO2 SERPL-SCNC: 29 MMOL/L (ref 20–32)
CO2 SERPL-SCNC: NORMAL MMOL/L (ref 20–32)
CREAT SERPL-MCNC: 0.54 MG/DL (ref 0.66–1.25)
CREAT SERPL-MCNC: NORMAL MG/DL (ref 0.66–1.25)
DIFFERENTIAL METHOD BLD: ABNORMAL
EOSINOPHIL # BLD AUTO: 0.1 10E9/L (ref 0–0.7)
EOSINOPHIL NFR BLD AUTO: 0.8 %
ERYTHROCYTE [DISTWIDTH] IN BLOOD BY AUTOMATED COUNT: 13.2 % (ref 10–15)
GFR SERPL CREATININE-BSD FRML MDRD: >90 ML/MIN/1.7M2
GFR SERPL CREATININE-BSD FRML MDRD: NORMAL ML/MIN/1.7M2
GLUCOSE BLDC GLUCOMTR-MCNC: 126 MG/DL (ref 70–99)
GLUCOSE BLDC GLUCOMTR-MCNC: 126 MG/DL (ref 70–99)
GLUCOSE BLDC GLUCOMTR-MCNC: 136 MG/DL (ref 70–99)
GLUCOSE BLDC GLUCOMTR-MCNC: 156 MG/DL (ref 70–99)
GLUCOSE BLDC GLUCOMTR-MCNC: 158 MG/DL (ref 70–99)
GLUCOSE BLDC GLUCOMTR-MCNC: 177 MG/DL (ref 70–99)
GLUCOSE SERPL-MCNC: 112 MG/DL (ref 70–99)
GLUCOSE SERPL-MCNC: NORMAL MG/DL (ref 70–99)
HCT VFR BLD AUTO: 30.1 % (ref 40–53)
HDLC SERPL-MCNC: 56 MG/DL
HGB BLD-MCNC: 10.4 G/DL (ref 13.3–17.7)
IMM GRANULOCYTES # BLD: 0 10E9/L (ref 0–0.4)
IMM GRANULOCYTES NFR BLD: 0.2 %
INR PPP: 1.13 (ref 0.86–1.14)
LDLC SERPL CALC-MCNC: 45 MG/DL
LYMPHOCYTES # BLD AUTO: 1 10E9/L (ref 0.8–5.3)
LYMPHOCYTES NFR BLD AUTO: 11.4 %
MAGNESIUM SERPL-MCNC: 1.7 MG/DL (ref 1.6–2.3)
MAGNESIUM SERPL-MCNC: NORMAL MG/DL (ref 1.6–2.3)
MCH RBC QN AUTO: 34.7 PG (ref 26.5–33)
MCHC RBC AUTO-ENTMCNC: 34.6 G/DL (ref 31.5–36.5)
MCV RBC AUTO: 100 FL (ref 78–100)
MONOCYTES # BLD AUTO: 1.1 10E9/L (ref 0–1.3)
MONOCYTES NFR BLD AUTO: 12.1 %
NEUTROPHILS # BLD AUTO: 6.7 10E9/L (ref 1.6–8.3)
NEUTROPHILS NFR BLD AUTO: 75.3 %
NONHDLC SERPL-MCNC: 59 MG/DL
NRBC # BLD AUTO: 0 10*3/UL
NRBC BLD AUTO-RTO: 0 /100
PHOSPHATE SERPL-MCNC: 3.2 MG/DL (ref 2.5–4.5)
PHOSPHATE SERPL-MCNC: NORMAL MG/DL (ref 2.5–4.5)
PLATELET # BLD AUTO: 209 10E9/L (ref 150–450)
POTASSIUM SERPL-SCNC: 4.2 MMOL/L (ref 3.4–5.3)
POTASSIUM SERPL-SCNC: NORMAL MMOL/L (ref 3.4–5.3)
PREALB SERPL IA-MCNC: NORMAL MG/DL (ref 15–45)
PROT SERPL-MCNC: NORMAL G/DL (ref 6.8–8.8)
RBC # BLD AUTO: 3 10E12/L (ref 4.4–5.9)
SODIUM SERPL-SCNC: 139 MMOL/L (ref 133–144)
SODIUM SERPL-SCNC: NORMAL MMOL/L (ref 133–144)
TRIGL SERPL-MCNC: 69 MG/DL
TRIGL SERPL-MCNC: NORMAL MG/DL
WBC # BLD AUTO: 8.9 10E9/L (ref 4–11)

## 2018-09-22 PROCEDURE — 25000128 H RX IP 250 OP 636

## 2018-09-22 PROCEDURE — 80048 BASIC METABOLIC PNL TOTAL CA: CPT | Performed by: SURGERY

## 2018-09-22 PROCEDURE — 83735 ASSAY OF MAGNESIUM: CPT | Performed by: SURGERY

## 2018-09-22 PROCEDURE — 25000128 H RX IP 250 OP 636: Performed by: PHYSICIAN ASSISTANT

## 2018-09-22 PROCEDURE — 85025 COMPLETE CBC W/AUTO DIFF WBC: CPT | Performed by: SURGERY

## 2018-09-22 PROCEDURE — 80061 LIPID PANEL: CPT | Performed by: SURGERY

## 2018-09-22 PROCEDURE — 80053 COMPREHEN METABOLIC PANEL: CPT | Performed by: SURGERY

## 2018-09-22 PROCEDURE — 12000007 ZZH R&B INTERMEDIATE

## 2018-09-22 PROCEDURE — 3E0436Z INTRODUCTION OF NUTRITIONAL SUBSTANCE INTO CENTRAL VEIN, PERCUTANEOUS APPROACH: ICD-10-PCS | Performed by: SURGERY

## 2018-09-22 PROCEDURE — 25000125 ZZHC RX 250: Performed by: PHYSICIAN ASSISTANT

## 2018-09-22 PROCEDURE — 84100 ASSAY OF PHOSPHORUS: CPT | Performed by: SURGERY

## 2018-09-22 PROCEDURE — 84630 ASSAY OF ZINC: CPT | Performed by: SURGERY

## 2018-09-22 PROCEDURE — 85610 PROTHROMBIN TIME: CPT | Performed by: SURGERY

## 2018-09-22 PROCEDURE — 25000131 ZZH RX MED GY IP 250 OP 636 PS 637

## 2018-09-22 PROCEDURE — 25000125 ZZHC RX 250: Performed by: SURGERY

## 2018-09-22 PROCEDURE — 40000239 ZZH STATISTIC VAT ROUNDS

## 2018-09-22 PROCEDURE — 00000146 ZZHCL STATISTIC GLUCOSE BY METER IP

## 2018-09-22 RX ADMIN — CEFOTETAN DISODIUM 1 G: 1 INJECTION, POWDER, FOR SOLUTION INTRAMUSCULAR; INTRAVENOUS at 08:04

## 2018-09-22 RX ADMIN — Medication: at 14:11

## 2018-09-22 RX ADMIN — FAMOTIDINE 20 MG: 10 INJECTION, SOLUTION INTRAVENOUS at 10:46

## 2018-09-22 RX ADMIN — SODIUM CHLORIDE, POTASSIUM CHLORIDE, SODIUM LACTATE AND CALCIUM CHLORIDE 1000 ML: 600; 310; 30; 20 INJECTION, SOLUTION INTRAVENOUS at 20:04

## 2018-09-22 RX ADMIN — INSULIN ASPART 1 UNITS: 100 INJECTION, SOLUTION INTRAVENOUS; SUBCUTANEOUS at 17:46

## 2018-09-22 RX ADMIN — INSULIN ASPART 1 UNITS: 100 INJECTION, SOLUTION INTRAVENOUS; SUBCUTANEOUS at 13:01

## 2018-09-22 RX ADMIN — ENOXAPARIN SODIUM 40 MG: 40 INJECTION SUBCUTANEOUS at 08:04

## 2018-09-22 RX ADMIN — SODIUM CHLORIDE, POTASSIUM CHLORIDE, SODIUM LACTATE AND CALCIUM CHLORIDE 1000 ML: 600; 310; 30; 20 INJECTION, SOLUTION INTRAVENOUS at 03:30

## 2018-09-22 RX ADMIN — FAMOTIDINE 20 MG: 10 INJECTION, SOLUTION INTRAVENOUS at 20:49

## 2018-09-22 ASSESSMENT — ACTIVITIES OF DAILY LIVING (ADL)
ADLS_ACUITY_SCORE: 11

## 2018-09-22 NOTE — PLAN OF CARE
Problem: Patient Care Overview  Goal: Plan of Care/Patient Progress Review  Outcome: Improving  Patient A&O. Speaks serb/Hong Konger and some broken english.  and daughter Marisabel utilized when needed. Able to make most needs known. Vitally stable ex mildly hypotensive-asymptomatic, sats 95% on RA. Midline incision dressing with serosanguinous extension, but WNL and not needing reinforcement. Abdomen flat/concave, BS hypo, denies flatus or nausea, binder and ice in place. Tolerating small amount ice chips. NG to LIS, unmeasurable brown output in tube. PCA at 0.2-pt states adequate for pain control. Fong in place, small amount concentrated UOP. PTA RUE PICC assessed by IV RN and Xray for placement per protocol, ok to use. Both lumens draw, caps changed. Plan for PTA TPN/lipids to resume tonight d/t long term diarrhea/malnutrition.  scheduled for 0900/1500 tomorrow.

## 2018-09-22 NOTE — PROGRESS NOTES
"General Surgery Progress Note    Subjective: hiccups, pain controled. comfortaable    Objective: /75 (BP Location: Left arm)  Pulse 66  Temp 98.5  F (36.9  C) (Oral)  Resp 18  Ht 5' 8.9\" (1.75 m)  Wt 130 lb (59 kg)  SpO2 98%  BMI 19.25 kg/m2  Gen: awake and cooperative.  Mouth: mucosa well hydrated  Eyes: anicteric   Pulm: nonlabored breathing  Abd: flat, appropriate   Ext: WWP    Assessment/Plan:   Manuel Andre  is a 63 year old male 1d s/p takedonw of gastro-entero-colic fistula  - await gi function - continue NG for now  - mobilize      Ace Harding MD  [facility].  [phone]          "

## 2018-09-22 NOTE — PLAN OF CARE
Problem: Patient Care Overview  Goal: Plan of Care/Patient Progress Review  AVSS. Pain controlled with PCA. Incisions drainage marker. LS clear. Tolerating NPO with ice chips. Up with assist of 1. BS active; negative flattus. TPN initiated; NG tube to LIS; some gastric output.

## 2018-09-22 NOTE — PROGRESS NOTES
Colon and Rectal Surgery Note         Assessment and Plan:   POD#1 s/p ex lap, transverse colectomy, reapair gastro-enteric-colonic fistula ( Dr. Harding).  Afebrile. Awaiting return of bowel function.   - Cont NPO, NGT, IVFs, TPN per gen surgery.  - Lovenox, SCDs for DVT ppx  - OOB and ambulation    Jaci Peraza MD  [facility]  Phone: [phone]  Fax: [phone]          Interval History:   Feeling fine this am.  Expected abdominal pain.  NGT bothersome.  Denies flatus.  No n/v.           Physical Exam:     Temp:  [97.5  F (36.4  C)-98.8  F (37.1  C)] 98.5  F (36.9  C)  Pulse:  [61-67] 66  Heart Rate:  [] 59  Resp:  [8-24] 18  BP: ()/() 119/75  SpO2:  [94 %-99 %] 98 %    General:  Pleasant, alert.  In no acute distress.  Abdomen:  Soft, non distended, non tender.  Wound clean/dry/intact.          Data:     I/O last 3 completed shifts:  In: 4258 [I.V.:3500; NG/GT:30]  Out: 1890 [Urine:1760; Emesis/NG output:100; Blood:30]    Lab Results   Component Value Date    WBC 8.9 09/22/2018    WBC 8.3 09/17/2018      Lab Results   Component Value Date    HGB 10.4 09/22/2018    HGB 11.5 09/21/2018

## 2018-09-22 NOTE — PLAN OF CARE
Problem: Bowel Resection (Adult)  Goal: Signs and Symptoms of Listed Potential Problems Will be Absent, Minimized or Managed (Bowel Resection)  Signs and symptoms of listed potential problems will be absent, minimized or managed by discharge/transition of care (reference Bowel Resection (Adult) CPG).   Outcome: No Change  Pt up ambulating with nursing assistant and walker today and reported pt tolerated. ivf /tpn/pca patent to right picc line. Escobar discontinued [id number] per order to remove post op day 1 by karen Cleveland. Next shift aware escobar removal time and pt due to void. Ng intact to Low intermittent suction 150 drainage this shift. Dressing intact with old marked drainage to mid abdomen and abdominal binder intact. No flatus hypoactive bowel sounds noted.

## 2018-09-23 LAB
ANION GAP SERPL CALCULATED.3IONS-SCNC: 6 MMOL/L (ref 3–14)
BUN SERPL-MCNC: 9 MG/DL (ref 7–30)
CALCIUM SERPL-MCNC: 7.7 MG/DL (ref 8.5–10.1)
CHLORIDE SERPL-SCNC: 100 MMOL/L (ref 94–109)
CO2 SERPL-SCNC: 30 MMOL/L (ref 20–32)
CREAT SERPL-MCNC: 0.48 MG/DL (ref 0.66–1.25)
GFR SERPL CREATININE-BSD FRML MDRD: >90 ML/MIN/1.7M2
GLUCOSE BLDC GLUCOMTR-MCNC: 103 MG/DL (ref 70–99)
GLUCOSE BLDC GLUCOMTR-MCNC: 115 MG/DL (ref 70–99)
GLUCOSE BLDC GLUCOMTR-MCNC: 122 MG/DL (ref 70–99)
GLUCOSE BLDC GLUCOMTR-MCNC: 122 MG/DL (ref 70–99)
GLUCOSE BLDC GLUCOMTR-MCNC: 124 MG/DL (ref 70–99)
GLUCOSE BLDC GLUCOMTR-MCNC: 152 MG/DL (ref 70–99)
GLUCOSE BLDC GLUCOMTR-MCNC: 155 MG/DL (ref 70–99)
GLUCOSE SERPL-MCNC: 143 MG/DL (ref 70–99)
MAGNESIUM SERPL-MCNC: 1.8 MG/DL (ref 1.6–2.3)
PHOSPHATE SERPL-MCNC: 2.5 MG/DL (ref 2.5–4.5)
POTASSIUM SERPL-SCNC: 3.6 MMOL/L (ref 3.4–5.3)
SODIUM SERPL-SCNC: 136 MMOL/L (ref 133–144)

## 2018-09-23 PROCEDURE — 25000125 ZZHC RX 250: Performed by: SURGERY

## 2018-09-23 PROCEDURE — 25000128 H RX IP 250 OP 636: Performed by: PHYSICIAN ASSISTANT

## 2018-09-23 PROCEDURE — 12000007 ZZH R&B INTERMEDIATE

## 2018-09-23 PROCEDURE — 00000146 ZZHCL STATISTIC GLUCOSE BY METER IP

## 2018-09-23 PROCEDURE — 84100 ASSAY OF PHOSPHORUS: CPT | Performed by: SURGERY

## 2018-09-23 PROCEDURE — 80048 BASIC METABOLIC PNL TOTAL CA: CPT | Performed by: SURGERY

## 2018-09-23 PROCEDURE — 83735 ASSAY OF MAGNESIUM: CPT | Performed by: SURGERY

## 2018-09-23 RX ORDER — HYDRALAZINE HYDROCHLORIDE 20 MG/ML
10 INJECTION INTRAMUSCULAR; INTRAVENOUS EVERY 6 HOURS PRN
Status: DISCONTINUED | OUTPATIENT
Start: 2018-09-23 | End: 2018-09-26 | Stop reason: HOSPADM

## 2018-09-23 RX ADMIN — I.V. FAT EMULSION 250 ML: 20 EMULSION INTRAVENOUS at 20:57

## 2018-09-23 RX ADMIN — Medication: at 13:58

## 2018-09-23 RX ADMIN — FAMOTIDINE 20 MG: 10 INJECTION, SOLUTION INTRAVENOUS at 20:56

## 2018-09-23 RX ADMIN — ENOXAPARIN SODIUM 40 MG: 40 INJECTION SUBCUTANEOUS at 08:29

## 2018-09-23 RX ADMIN — ASCORBIC ACID, VITAMIN A PALMITATE, CHOLECALCIFEROL, THIAMINE HYDROCHLORIDE, RIBOFLAVIN-5 PHOSPHATE SODIUM, PYRIDOXINE HYDROCHLORIDE, NIACINAMIDE, DEXPANTHENOL, ALPHA-TOCOPHEROL ACETATE, VITAMIN K1, FOLIC ACID, BIOTIN, CYANOCOBALAMIN: 200; 3300; 200; 6; 3.6; 6; 40; 15; 10; 150; 600; 60; 5 INJECTION, SOLUTION INTRAVENOUS at 12:56

## 2018-09-23 RX ADMIN — INSULIN ASPART 1 UNITS: 100 INJECTION, SOLUTION INTRAVENOUS; SUBCUTANEOUS at 01:27

## 2018-09-23 RX ADMIN — FAMOTIDINE 20 MG: 10 INJECTION, SOLUTION INTRAVENOUS at 09:23

## 2018-09-23 ASSESSMENT — ACTIVITIES OF DAILY LIVING (ADL)
ADLS_ACUITY_SCORE: 11

## 2018-09-23 NOTE — PLAN OF CARE
Problem: Patient Care Overview  Goal: Plan of Care/Patient Progress Review  AVSS. Pain controlled with PCA. Incisions marked draniage. LS clear and equal. Tolerating NPO NG tube LIS. Up with assist of 1. BS hypo. ABD binder in place.

## 2018-09-23 NOTE — PLAN OF CARE
Problem: Bowel Resection (Adult)  Goal: Signs and Symptoms of Listed Potential Problems Will be Absent, Minimized or Managed (Bowel Resection)  Signs and symptoms of listed potential problems will be absent, minimized or managed by discharge/transition of care (reference Bowel Resection (Adult) CPG).   Outcome: No Change  Pt tolerating ambulation with stand by assist of staff. Voiding clear yellow urine. Positive bowel sounds no flatus. ivf patent to right picc ng intact see flow sheet for clamped 4 hours suction 4 hours residual this shift is 50ml . Incision intact with steri strips and new dressing applied has abdominal binder in place. Encouraged IS use see vital flow sheet

## 2018-09-23 NOTE — PLAN OF CARE
Problem: Patient Care Overview  Goal: Plan of Care/Patient Progress Review  Outcome: Improving  A/O x4. AVSS on RA; HTN. Up w/ 1, walker. Bosnian speaking. Pain managed w/ PCA dilaudid. NG to LIS, brown output. TPN infusing @ 65ml/hr. Midline incision, dried drainage, dressing marked- no extension noted. Abd binder in place. Hypo BS,- Flatus. NPO; ice chips. Voiding.

## 2018-09-23 NOTE — PROGRESS NOTES
Pt's ng clamped per order from [id number] and tolerated without nausea. During this interval patient declined to take any sips of clear liquids .NG placed back to suction and residual amount 50 ml brown drainage remains to suction for 4 hours next shift will be notified of new order.

## 2018-09-24 LAB
ALBUMIN SERPL-MCNC: 1.8 G/DL (ref 3.4–5)
ALP SERPL-CCNC: 84 U/L (ref 40–150)
ALT SERPL W P-5'-P-CCNC: 22 U/L (ref 0–70)
ANION GAP SERPL CALCULATED.3IONS-SCNC: 4 MMOL/L (ref 3–14)
AST SERPL W P-5'-P-CCNC: 21 U/L (ref 0–45)
BILIRUB SERPL-MCNC: 0.3 MG/DL (ref 0.2–1.3)
BUN SERPL-MCNC: 10 MG/DL (ref 7–30)
CALCIUM SERPL-MCNC: 7.8 MG/DL (ref 8.5–10.1)
CHLORIDE SERPL-SCNC: 101 MMOL/L (ref 94–109)
CO2 SERPL-SCNC: 32 MMOL/L (ref 20–32)
COPATH REPORT: NORMAL
CREAT SERPL-MCNC: 0.51 MG/DL (ref 0.66–1.25)
GFR SERPL CREATININE-BSD FRML MDRD: >90 ML/MIN/1.7M2
GLUCOSE BLDC GLUCOMTR-MCNC: 103 MG/DL (ref 70–99)
GLUCOSE BLDC GLUCOMTR-MCNC: 118 MG/DL (ref 70–99)
GLUCOSE BLDC GLUCOMTR-MCNC: 89 MG/DL (ref 70–99)
GLUCOSE BLDC GLUCOMTR-MCNC: 92 MG/DL (ref 70–99)
GLUCOSE BLDC GLUCOMTR-MCNC: 95 MG/DL (ref 70–99)
GLUCOSE SERPL-MCNC: 83 MG/DL (ref 70–99)
INR PPP: 1.02 (ref 0.86–1.14)
MAGNESIUM SERPL-MCNC: 1.8 MG/DL (ref 1.6–2.3)
PHOSPHATE SERPL-MCNC: 3 MG/DL (ref 2.5–4.5)
PLATELET # BLD AUTO: 214 10E9/L (ref 150–450)
POTASSIUM SERPL-SCNC: 3.4 MMOL/L (ref 3.4–5.3)
PREALB SERPL IA-MCNC: 8 MG/DL (ref 15–45)
PROT SERPL-MCNC: 5 G/DL (ref 6.8–8.8)
SODIUM SERPL-SCNC: 137 MMOL/L (ref 133–144)
TRIGL SERPL-MCNC: 84 MG/DL

## 2018-09-24 PROCEDURE — 85049 AUTOMATED PLATELET COUNT: CPT | Performed by: SURGERY

## 2018-09-24 PROCEDURE — 25000128 H RX IP 250 OP 636: Performed by: PHYSICIAN ASSISTANT

## 2018-09-24 PROCEDURE — 25000125 ZZHC RX 250: Performed by: SURGERY

## 2018-09-24 PROCEDURE — 40000239 ZZH STATISTIC VAT ROUNDS

## 2018-09-24 PROCEDURE — 80053 COMPREHEN METABOLIC PANEL: CPT | Performed by: SURGERY

## 2018-09-24 PROCEDURE — 85610 PROTHROMBIN TIME: CPT | Performed by: SURGERY

## 2018-09-24 PROCEDURE — 84478 ASSAY OF TRIGLYCERIDES: CPT | Performed by: SURGERY

## 2018-09-24 PROCEDURE — 25000125 ZZHC RX 250

## 2018-09-24 PROCEDURE — 84100 ASSAY OF PHOSPHORUS: CPT | Performed by: SURGERY

## 2018-09-24 PROCEDURE — 84134 ASSAY OF PREALBUMIN: CPT | Performed by: SURGERY

## 2018-09-24 PROCEDURE — 00000146 ZZHCL STATISTIC GLUCOSE BY METER IP

## 2018-09-24 PROCEDURE — 83735 ASSAY OF MAGNESIUM: CPT | Performed by: SURGERY

## 2018-09-24 PROCEDURE — 12000007 ZZH R&B INTERMEDIATE

## 2018-09-24 RX ADMIN — ASCORBIC ACID, VITAMIN A PALMITATE, CHOLECALCIFEROL, THIAMINE HYDROCHLORIDE, RIBOFLAVIN-5 PHOSPHATE SODIUM, PYRIDOXINE HYDROCHLORIDE, NIACINAMIDE, DEXPANTHENOL, ALPHA-TOCOPHEROL ACETATE, VITAMIN K1, FOLIC ACID, BIOTIN, CYANOCOBALAMIN: 200; 3300; 200; 6; 3.6; 6; 40; 15; 10; 150; 600; 60; 5 INJECTION, SOLUTION INTRAVENOUS at 17:37

## 2018-09-24 RX ADMIN — FAMOTIDINE 20 MG: 10 INJECTION, SOLUTION INTRAVENOUS at 09:15

## 2018-09-24 RX ADMIN — FAMOTIDINE 20 MG: 10 INJECTION, SOLUTION INTRAVENOUS at 20:26

## 2018-09-24 RX ADMIN — I.V. FAT EMULSION 250 ML: 20 EMULSION INTRAVENOUS at 20:26

## 2018-09-24 RX ADMIN — ENOXAPARIN SODIUM 40 MG: 40 INJECTION SUBCUTANEOUS at 09:15

## 2018-09-24 ASSESSMENT — ACTIVITIES OF DAILY LIVING (ADL)
ADLS_ACUITY_SCORE: 11

## 2018-09-24 NOTE — PLAN OF CARE
Problem: Patient Care Overview  Goal: Plan of Care/Patient Progress Review  Outcome: Improving  A&O x4. VSS on room air. Lungs clear and diminished. BS+, no BM or flatus. Tolerating sips of clears. Gave 3 servings of apple juice. NG clamped at 1130. To suction again at 1730 per orders. Denies N/V. Voiding adequately. TPN at 85 mL/hr and LR at 15 mL/hr. Dressing with marked scant serous drainage, CDI. PCA for pain. Up with SBA.

## 2018-09-24 NOTE — PROGRESS NOTES
"2Surgery    No new complaints this morning.   Really wants NG removed.   Hungry and wants to eat. Has not been consuming much for clears.  Denies flatus/bm.  Denies nausea throughout day during clamping trials.   Pain controlled with occ PCA dosing.   Walking 4-5x daily.     Gen:  Awake, Alert, NAD. Pleasant  Blood pressure 102/61, pulse 79, temperature 99.2  F (37.3  C), temperature source Oral, resp. rate 16, height 1.75 m (5' 8.9\"), weight 59.2 kg (130 lb 8.2 oz), SpO2 95 %.  Resp - nonlabored, clear to ascultation.    Cardiac - Regular rate & rhythm without murmur  Abdomen - soft, appropriately tender, non distended. Incision healing appropriately without erythema or purulent drainage. Dressing clean.  Extremities - no lower extremity edema or calf tenderness.    Labs reviewed:   NGT 290cc/24hrs. Reddish brown color - dilute    A/P Manuel Andre is a 63 year old male s/p takedown of gastro-entero-colonic fistula on 9/21/18    - Doing well.   - TPN continues.  - Await GI function  - NG clamping trials this morning. May remove NG later today if he continues to do well.   - Add sips of clears/meds/ice chips to NPO order.    Ryder Finn PA-C  Office: [phone]  Pager: [phone]      "

## 2018-09-24 NOTE — PROGRESS NOTES
COLON & RECTAL SURGERY  PROGRESS NOTE    September 24, 2018  Post-op Day # 3    SUBJECTIVE:  The patient is doing well. He really wants the NG tube removed. He denies any flatus or bowel movement yet. He denies abdominal pain.     OBJECTIVE:  Temp:  [98.5  F (36.9  C)-99.2  F (37.3  C)] 99.2  F (37.3  C)  Pulse:  [79-93] 79  Heart Rate:  [88-93] 89  Resp:  [16-18] 16  BP: (100-124)/(61-95) 102/61  SpO2:  [95 %-97 %] 95 %    Intake/Output Summary (Last 24 hours) at 09/24/18 0953  Last data filed at 09/24/18 0933   Gross per 24 hour   Intake             2242 ml   Output             3265 ml   Net            -1023 ml       GENERAL:  Awake, alert, no acute distress,   HEAD - Nomocephalic atraumatic  SCLERA anicteric  EXTREMITIES warm and well perfused  ABDOMEN:  Soft, non tender, non-distended,   INCISION:  C/d/i,     LABS:  Lab Results   Component Value Date    WBC 8.9 09/22/2018     Lab Results   Component Value Date    HGB 10.4 09/22/2018     Lab Results   Component Value Date    HCT 30.1 09/22/2018     Lab Results   Component Value Date     09/24/2018     Last Basic Metabolic Panel:  Lab Results   Component Value Date     09/24/2018      Lab Results   Component Value Date    POTASSIUM 3.4 09/24/2018     Lab Results   Component Value Date    CHLORIDE 101 09/24/2018     Lab Results   Component Value Date    LANCE 7.8 09/24/2018     Lab Results   Component Value Date    CO2 32 09/24/2018     Lab Results   Component Value Date    BUN 10 09/24/2018     Lab Results   Component Value Date    CR 0.51 09/24/2018     Lab Results   Component Value Date    GLC 83 09/24/2018       ASSESSMENT/PLAN:POD#3 ex lap, transverse colectomy, repair gastro-enteric-colonic fistula.   1. NG tube per gen surg  2. Continue TPN and IVF  3. OOB, ambulate  4. Await return of bowel function    Madisyn Cabello PA-C  [facility]  [phone]      CRS Staff  I performed a history and physical examination of the  patient and discussed their management with the physician assistant. I reviewed the physician assistants note and agree with the documented findings and plan of care.     Abdomen soft, minimally tender.  Looks good.    Momo Andrews MD  Colorectal Surgery  [phone] (office)  [phone] (pager)  [email or web address]

## 2018-09-24 NOTE — PLAN OF CARE
Problem: Bowel Resection (Adult)  Goal: Signs and Symptoms of Listed Potential Problems Will be Absent, Minimized or Managed (Bowel Resection)  Signs and symptoms of listed potential problems will be absent, minimized or managed by discharge/transition of care (reference Bowel Resection (Adult) CPG).   Outcome: Improving  Admitted for Gastro-enteric-colonic fistula. Patient is POD2 from a bowel resection and repair of gastriccolic fistula. VS wdl. A/Ox4. Mid line incision is covered, dressing is cdi. Pain controlled with PCA. Up with A1. Sips of clears diet. TPN infusing @ 65ml/hr, lipids @ 20ml. Blood xobmmc328/124. +gas, +bm. Voiding to urinal. LS clear. IS to 1000.

## 2018-09-24 NOTE — PROGRESS NOTES
CLINICAL NUTRITION SERVICES - REASSESSMENT NOTE      Recommendations Ordered by Registered Dietitian (RD):   Increase TPN D15 AA5 to 85 mL/hr + Lipids 250 mL daily = 1948 kcals (33 kcal/kg and 95% energy needs), 102 gm pro (1.7 gm/kg), 306 gm CHO, 26% fat.   Malnutrition:  ()  % Weight Loss:  > 10% in 6 months (severe malnutrition)  % Intake:  No decreased intake noted, but question whether nutrients consumed were being absorbed due to chronic diarrhea  Subcutaneous Fat Loss:  Orbital region moderate depletion and Upper arm region moderate depletion  Muscle Loss:  Temporal region moderate depletion and Clavicle bone region severe depletion  Fluid Retention:  Mild-Moderate suspected- Likely nutrition related     Malnutrition Diagnosis: Severe malnutrition  In Context of:  Acute illness or injury  Chronic illness or disease       EVALUATION OF PROGRESS TOWARD GOALS   Diet:  NPO    Nutrition Support:  TPN was started on  at 40 mL/hr and increased on  to 65 mL/hr as below:    Nutrition Support Parenteral:  Type of Access:  Central line  Parenteral Frequency:  Continuous  Parenteral Regimen:  D15 AA5 at 65 mL/hr + Lipids 250 mL daily  Total Parenteral Provisions:  1608 kcals (27 kcal/kg), 78 gm pro (1.3 gm/kg), 234 gm CHO, 31% fat     Intake/Tolerance:  Refeeding labs were as follows (-):  K:  3.8 --> 4.2 --> 3.6 --> 3.4 - acceptable  M.6 --> 1.7 --> 1.8 --> 1.8 - acceptable  Phos:  4.2 --> 3.2 --> 2.5 --> 3.0 - acceptable  BGM:  155, 122, 115, 124, 103, 92 - acceptable.  Wt:  59.2 kg (up 0.2 kg since admit).      ASSESSED NUTRITION NEEDS PER APPROVED PRACTICE GUIDELINES:     Dosing Weight:  59 kg (admit wt)   Estimated Energy Needs: (Change to final needs)  3967-2381 kcals (35-40 kcal/kg)  Justification: repletion, post-op and underweight  Estimated Protein Needs:   grams protein (1.5-2 g pro/Kg)  Justification: Repletion and post-op      NEW FINDINGS:   Yesterday MD wanted 85 mL/hr as  total fluids, per Pharmacy.  (RD had recommended goal TPN rate 90 mL/hr).  N mL yesterday.  Plan clamp NG and possibly removed it later today.    Previous Goals ():   Pt will tolerate TPN without refeeding syndrome   Evaluation: Met    Previous Nutrition Diagnosis ():   Malnutrition related to altered GI function as evidenced by chronic diarrhea, 19% weight loss over past 6 months, recent presence of edema, and loss of fat and muscle stores.  Evaluation:  No change;  Modified as below      CURRENT NUTRITION DIAGNOSIS  Inadequate parenteral nutrition infusion related to low TPN rate as evidenced by meeting 78% energy and 87% protein needs in pt ready to advance TPN to meet final energy needs    INTERVENTIONS  Recommendations / Nutrition Prescription  Increase TPN D15 AA5 to 85 mL/hr + Lipids 250 mL daily = 1948 kcals (33 kcal/kg and 95% energy needs), 102 gm pro (1.7 gm/kg), 306 gm CHO, 26% fat.    Implementation  Collaboration and Referral of Nutrition care - Discussed with Pharmacist this am, who is not sure why the TPN rate was not increased yesterday, as refeeding labs were acceptable.  She will increase TPN rate now.      Goals  TPN/Lipids will meet % estimated needs while NPO status.      MONITORING AND EVALUATION:  Progress towards goals will be monitored and evaluated per protocol and Practice Guidelines    Cathy Ortega, STEPHEN, LD, CNSC

## 2018-09-24 NOTE — PLAN OF CARE
Problem: Patient Care Overview  Goal: Plan of Care/Patient Progress Review  Outcome: No Change  Vital signs stable. Alert and oriented. Lung sounds clear. Bowel sounds hypoactive, flatus negative. Midline dressing clean dry and intact. Pain controlled with PCA. Up with assist of one. Tolerating Sips and chips. CMS intact. TPN and lipids at 85 ml/hr combined.

## 2018-09-25 LAB
GLUCOSE BLDC GLUCOMTR-MCNC: 102 MG/DL (ref 70–99)
GLUCOSE BLDC GLUCOMTR-MCNC: 106 MG/DL (ref 70–99)
GLUCOSE BLDC GLUCOMTR-MCNC: 111 MG/DL (ref 70–99)
GLUCOSE BLDC GLUCOMTR-MCNC: 115 MG/DL (ref 70–99)
GLUCOSE BLDC GLUCOMTR-MCNC: 120 MG/DL (ref 70–99)
ZINC SERPL-MCNC: 31 UG/DL (ref 60–120)

## 2018-09-25 PROCEDURE — 12000007 ZZH R&B INTERMEDIATE

## 2018-09-25 PROCEDURE — 25000132 ZZH RX MED GY IP 250 OP 250 PS 637: Performed by: PHYSICIAN ASSISTANT

## 2018-09-25 PROCEDURE — 25000125 ZZHC RX 250: Performed by: SURGERY

## 2018-09-25 PROCEDURE — 00000146 ZZHCL STATISTIC GLUCOSE BY METER IP

## 2018-09-25 PROCEDURE — 25000128 H RX IP 250 OP 636: Performed by: PHYSICIAN ASSISTANT

## 2018-09-25 PROCEDURE — 40000239 ZZH STATISTIC VAT ROUNDS

## 2018-09-25 PROCEDURE — 25000125 ZZHC RX 250

## 2018-09-25 RX ORDER — OXYCODONE HYDROCHLORIDE 5 MG/1
5 TABLET ORAL EVERY 4 HOURS PRN
Status: DISCONTINUED | OUTPATIENT
Start: 2018-09-25 | End: 2018-09-26 | Stop reason: HOSPADM

## 2018-09-25 RX ADMIN — FAMOTIDINE 20 MG: 10 INJECTION, SOLUTION INTRAVENOUS at 20:24

## 2018-09-25 RX ADMIN — FAMOTIDINE 20 MG: 10 INJECTION, SOLUTION INTRAVENOUS at 09:04

## 2018-09-25 RX ADMIN — I.V. FAT EMULSION 250 ML: 20 EMULSION INTRAVENOUS at 20:11

## 2018-09-25 RX ADMIN — ENOXAPARIN SODIUM 40 MG: 40 INJECTION SUBCUTANEOUS at 09:04

## 2018-09-25 RX ADMIN — OXYCODONE HYDROCHLORIDE 5 MG: 5 TABLET ORAL at 19:33

## 2018-09-25 RX ADMIN — ASCORBIC ACID, VITAMIN A PALMITATE, CHOLECALCIFEROL, THIAMINE HYDROCHLORIDE, RIBOFLAVIN-5 PHOSPHATE SODIUM, PYRIDOXINE HYDROCHLORIDE, NIACINAMIDE, DEXPANTHENOL, ALPHA-TOCOPHEROL ACETATE, VITAMIN K1, FOLIC ACID, BIOTIN, CYANOCOBALAMIN: 200; 3300; 200; 6; 3.6; 6; 40; 15; 10; 150; 600; 60; 5 INJECTION, SOLUTION INTRAVENOUS at 17:38

## 2018-09-25 RX ADMIN — OXYCODONE HYDROCHLORIDE 5 MG: 5 TABLET ORAL at 23:35

## 2018-09-25 ASSESSMENT — ACTIVITIES OF DAILY LIVING (ADL)
ADLS_ACUITY_SCORE: 11

## 2018-09-25 NOTE — PLAN OF CARE
Problem: Patient Care Overview  Goal: Plan of Care/Patient Progress Review  Outcome: Improving  Vital signs stable. Alert and oriented. Lung sounds clear. Bowel sounds hypoactive, flatus negative. Abdominal dressing clean dry and intact. Pain controlled with PCA. Abdominal binder in place. Up with stand by assist. Tolerating sips and chidps. CMS intact. TPN rate maintained and PICC line draws well.

## 2018-09-25 NOTE — PROGRESS NOTES
"Surgery    Hungry and wants to eat.   Some pain with transferring, but generally comfortable.   NG removed yesterday.   Denies nausea.   Walking regularly.   No CP, dyspnea.   Daughter and  present    Gen:  Awake, Alert, NAD  Blood pressure 91/58, pulse 71, temperature 98.5  F (36.9  C), temperature source Oral, resp. rate 16, height 1.75 m (5' 8.9\"), weight 59.1 kg (130 lb 4.7 oz), SpO2 96 %.  Resp - non labored.    Abdomen - soft, non tender, mildly distended. Dressing clean  Extremities - no lower extremity edema.    A/P Manuel Andre is a 63 year old male s/p takedown of gastro-entero-colonic fistula on 9/21/18    - clear liquids sparingly.   - decrease IVF/TPN volume accordingly  - po pain meds.  - continue walking.    Ryder Finn PA-C  Office: [phone]  Pager: [phone]    "

## 2018-09-25 NOTE — PROGRESS NOTES
SPIRITUAL HEALTH SERVICES Progress Note  [facility]     attempted visit based on length of stay.  Patient said he was not interested in a visit from Spiritual Health.    No plans to follow up.    Demarco Yu   Intern

## 2018-09-25 NOTE — PLAN OF CARE
Problem: Patient Care Overview  Goal: Plan of Care/Patient Progress Review  Outcome: No Change  Vital signs stable. Alert and oriented. Lung sounds clear. Bowel sounds hypoactive, flatus negative. Abdominal dressing clean dry and intact. Pain controlled with PCA. Up with stand by assist. Tolerating sips and chidps. CMS intact. TPN rate maintained and PICC line draws well.

## 2018-09-25 NOTE — PROGRESS NOTES
This is a recent snapshot of the patient's [facility] medical record.  For current drug dose and complete information and questions, call [phone] or In Basket pool, [facility] home infusion ([id number])  CSN Number:  [id number]

## 2018-09-25 NOTE — PLAN OF CARE
Problem: Patient Care Overview  Goal: Plan of Care/Patient Progress Review  Outcome: Improving  A/O x4. VSS on RA. Up w/ 1. Bosnian speaking. Pain managed w/ PCA dilaudid. NG discontinued. TPN infusing @ 85ml/hr. Lipids infusing. Midline incision, dried drainage, dressing marked- no extension noted. Abd binder in place. + BS,- Flatus. NPO. Tolerating ice chips; sips of clears. Voiding.

## 2018-09-25 NOTE — PROGRESS NOTES
COLON & RECTAL SURGERY  PROGRESS NOTE    September 25, 2018  Post-op Day # 4    SUBJECTIVE:  The patient was seen without an . He states he passed a tiny amount of gas. He denies nausea or vomiting since NG tube removed. He denies abdominal pain    OBJECTIVE:  Temp:  [98.4  F (36.9  C)-99.2  F (37.3  C)] 98.4  F (36.9  C)  Pulse:  [70-87] 70  Heart Rate:  [80-84] 84  Resp:  [16] 16  BP: ()/(58-69) 91/58  SpO2:  [96 %-100 %] 96 %    Intake/Output Summary (Last 24 hours) at 09/25/18 0833  Last data filed at 09/25/18 0812   Gross per 24 hour   Intake             2970 ml   Output             3505 ml   Net             -535 ml       GENERAL:  Awake, alert, no acute distress,   HEAD - Nomocephalic atraumatic  SCLERA anicteric  EXTREMITIES warm and well perfused  ABDOMEN:  Soft, non tender, slightly-distended, tympanic on percussions  INCISION:  C/d/i,     LABS:  Lab Results   Component Value Date    WBC 8.9 09/22/2018     Lab Results   Component Value Date    HGB 10.4 09/22/2018     Lab Results   Component Value Date    HCT 30.1 09/22/2018     Lab Results   Component Value Date     09/24/2018     Last Basic Metabolic Panel:  Lab Results   Component Value Date     09/24/2018      Lab Results   Component Value Date    POTASSIUM 3.4 09/24/2018     Lab Results   Component Value Date    CHLORIDE 101 09/24/2018     Lab Results   Component Value Date    LANCE 7.8 09/24/2018     Lab Results   Component Value Date    CO2 32 09/24/2018     Lab Results   Component Value Date    BUN 10 09/24/2018     Lab Results   Component Value Date    CR 0.51 09/24/2018     Lab Results   Component Value Date    GLC 83 09/24/2018       ASSESSMENT/PLAN:POD#4 ex lap, transverse colectomy, repair of gastro-enteric-colonic fistula  1. Diet per general surgery  2. Continue TPN and IVF  3. OOB, ambulate  4. Await more complete return of bowel function    Madisyn Cabello PA-C  [facility]  [phone]        CRS Staff  I performed a history and physical examination of the patient and discussed their management with the physician assistant. I reviewed the physician assistants note and agree with the documented findings and plan of care.       Looks good.  Reviewed path with him.  Cont TPN.  Diet per gen surg.  OK to feed from our standpoint.    Momo Andrews MD  Colorectal Surgery  [phone] (office)  [phone] (pager)  [email or web address]

## 2018-09-26 ENCOUNTER — HOME INFUSION (PRE-WILLOW HOME INFUSION) (OUTPATIENT)
Dept: PHARMACY | Facility: CLINIC | Age: 63
End: 2018-09-26

## 2018-09-26 VITALS
TEMPERATURE: 99.3 F | OXYGEN SATURATION: 97 % | HEIGHT: 69 IN | DIASTOLIC BLOOD PRESSURE: 62 MMHG | SYSTOLIC BLOOD PRESSURE: 97 MMHG | WEIGHT: 128.53 LBS | HEART RATE: 76 BPM | BODY MASS INDEX: 19.04 KG/M2 | RESPIRATION RATE: 18 BRPM

## 2018-09-26 LAB
ALBUMIN SERPL-MCNC: 1.9 G/DL (ref 3.4–5)
ALP SERPL-CCNC: 334 U/L (ref 40–150)
ALT SERPL W P-5'-P-CCNC: 110 U/L (ref 0–70)
ANION GAP SERPL CALCULATED.3IONS-SCNC: 6 MMOL/L (ref 3–14)
AST SERPL W P-5'-P-CCNC: 112 U/L (ref 0–45)
BILIRUB SERPL-MCNC: 0.4 MG/DL (ref 0.2–1.3)
BUN SERPL-MCNC: 14 MG/DL (ref 7–30)
CALCIUM SERPL-MCNC: 8 MG/DL (ref 8.5–10.1)
CHLORIDE SERPL-SCNC: 103 MMOL/L (ref 94–109)
CO2 SERPL-SCNC: 30 MMOL/L (ref 20–32)
CREAT SERPL-MCNC: 0.54 MG/DL (ref 0.66–1.25)
GFR SERPL CREATININE-BSD FRML MDRD: >90 ML/MIN/1.7M2
GLUCOSE BLDC GLUCOMTR-MCNC: 111 MG/DL (ref 70–99)
GLUCOSE BLDC GLUCOMTR-MCNC: 117 MG/DL (ref 70–99)
GLUCOSE BLDC GLUCOMTR-MCNC: 119 MG/DL (ref 70–99)
GLUCOSE SERPL-MCNC: 96 MG/DL (ref 70–99)
INR PPP: 1.02 (ref 0.86–1.14)
MAGNESIUM SERPL-MCNC: 2.3 MG/DL (ref 1.6–2.3)
PHOSPHATE SERPL-MCNC: 3.4 MG/DL (ref 2.5–4.5)
POTASSIUM SERPL-SCNC: 3.7 MMOL/L (ref 3.4–5.3)
PROT SERPL-MCNC: 5.7 G/DL (ref 6.8–8.8)
SODIUM SERPL-SCNC: 139 MMOL/L (ref 133–144)
TRIGL SERPL-MCNC: 76 MG/DL

## 2018-09-26 PROCEDURE — 80053 COMPREHEN METABOLIC PANEL: CPT | Performed by: PHYSICIAN ASSISTANT

## 2018-09-26 PROCEDURE — 84100 ASSAY OF PHOSPHORUS: CPT | Performed by: PHYSICIAN ASSISTANT

## 2018-09-26 PROCEDURE — 25000125 ZZHC RX 250: Performed by: SURGERY

## 2018-09-26 PROCEDURE — 83735 ASSAY OF MAGNESIUM: CPT | Performed by: PHYSICIAN ASSISTANT

## 2018-09-26 PROCEDURE — 00000146 ZZHCL STATISTIC GLUCOSE BY METER IP

## 2018-09-26 PROCEDURE — 25000128 H RX IP 250 OP 636: Performed by: PHYSICIAN ASSISTANT

## 2018-09-26 PROCEDURE — 85610 PROTHROMBIN TIME: CPT | Performed by: PHYSICIAN ASSISTANT

## 2018-09-26 PROCEDURE — 25000132 ZZH RX MED GY IP 250 OP 250 PS 637: Performed by: PHYSICIAN ASSISTANT

## 2018-09-26 PROCEDURE — 40000239 ZZH STATISTIC VAT ROUNDS

## 2018-09-26 PROCEDURE — 84478 ASSAY OF TRIGLYCERIDES: CPT | Performed by: PHYSICIAN ASSISTANT

## 2018-09-26 RX ORDER — OXYCODONE HYDROCHLORIDE 5 MG/1
5 TABLET ORAL EVERY 4 HOURS PRN
Qty: 20 TABLET | Refills: 0 | Status: SHIPPED | OUTPATIENT
Start: 2018-09-26

## 2018-09-26 RX ADMIN — ENOXAPARIN SODIUM 40 MG: 40 INJECTION SUBCUTANEOUS at 09:20

## 2018-09-26 RX ADMIN — FAMOTIDINE 20 MG: 10 INJECTION, SOLUTION INTRAVENOUS at 09:20

## 2018-09-26 RX ADMIN — OXYCODONE HYDROCHLORIDE 5 MG: 5 TABLET ORAL at 03:56

## 2018-09-26 ASSESSMENT — ACTIVITIES OF DAILY LIVING (ADL)
ADLS_ACUITY_SCORE: 11

## 2018-09-26 NOTE — CONSULTS
Care Transition Initial Assessment - RN        Met with: Patient, Family and professional .  DATA   Active Problems:    [place]-enteric fistula    Anemia    Lung nodule    Gastrocolic fistula       Cognitive Status: awake, alert and oriented.        Contact information and PCP information verified: Yes  Lives With: child(david), adult          Insurance concerns: No Insurance issues identified  ASSESSMENT  Patient currently receives the following services:  [facility] for fluids and IV vitamins        Identified issues/concerns regarding health management: malnutrition  Patient will need Home Infusion services now for TPN. I called and spoke with Ingris at [facility] regarding needing TPN. Ingris checked insurance coverage and he has coverage for TPN. Multiple back and forth conversations regarding continuous vs nocturnal. Patient strongly desires to go home today and soon. I confirmed with colo rectal and general surgery ok for discharge. Patient tolerated 100% of his dinner. Discussed with Dr. Harding, ok to be off TPN 3-4 hours until TPN can get to the house as patient anxious to leave. I explained to patient that nursing will wean down TPN and then be able to discharge him. He will then expect [facility] to come to the home this evening for TPN hookup and further education. TPN orders were faxed ( [phone]) to [facility] and communicated with Ingris HARRIS at Home [facility]fusion .  PLAN  Financial costs for the patient include per insurance .  Patient given options and choices for discharge yes .  Patient/family is agreeable to the plan?  Yes: daughter will also be with patient and speaks bilingual  Patient anticipates discharging to home .        Patient anticipates needs for home equipment: Yes, home infusion supplies furnished by [facility]   Plan/Disposition: Home   Care  (CTS) will continue to follow as needed.

## 2018-09-26 NOTE — PROGRESS NOTES
COLON & RECTAL SURGERY  PROGRESS NOTE    September 26, 2018  Post-op Day # 5    SUBJECTIVE:  The patient is doing well. He had a bowel movement last night and this morning. He reports he is passing gas. He denies nausea or vomiting. He would like to go home.     OBJECTIVE:  Temp:  [98.2  F (36.8  C)-99  F (37.2  C)] 98.4  F (36.9  C)  Pulse:  [67-80] 67  Heart Rate:  [67-78] 68  Resp:  [16-20] 18  BP: ()/(58-68) 96/62  SpO2:  [96 %-98 %] 98 %    Intake/Output Summary (Last 24 hours) at 09/26/18 0822  Last data filed at 09/26/18 0615   Gross per 24 hour   Intake             2212 ml   Output             1300 ml   Net              912 ml       GENERAL:  Awake, alert, no acute distress,   HEAD - Nomocephalic atraumatic  SCLERA anicteric  EXTREMITIES warm and well perfused  ABDOMEN:  Soft, non tender, non-distended,   INCISION:  C/d/i, steri strips in place    LABS:  Lab Results   Component Value Date    WBC 8.9 09/22/2018     Lab Results   Component Value Date    HGB 10.4 09/22/2018     Lab Results   Component Value Date    HCT 30.1 09/22/2018     Lab Results   Component Value Date     09/24/2018     Last Basic Metabolic Panel:  Lab Results   Component Value Date     09/26/2018      Lab Results   Component Value Date    POTASSIUM 3.7 09/26/2018     Lab Results   Component Value Date    CHLORIDE 103 09/26/2018     Lab Results   Component Value Date    LANCE 8.0 09/26/2018     Lab Results   Component Value Date    CO2 30 09/26/2018     Lab Results   Component Value Date    BUN 14 09/26/2018     Lab Results   Component Value Date    CR 0.54 09/26/2018     Lab Results   Component Value Date    GLC 96 09/26/2018       ASSESSMENT/PLAN:POD#5 ex lap, transverse colectomy, repair of gastro-enteric-colonic fistula  1. Diet per gen surgery  2. Continue TPN  3. OOB, ambulate  4. Okay to discharge home from colorectal surgery standpoint. Resume home TPN. Discharge orders placed.     Madisyn Cabello PA-C  [facility]  [phone]

## 2018-09-26 NOTE — PLAN OF CARE
"Problem: Patient Care Overview  Goal: Plan of Care/Patient Progress Review  Outcome: Improving  A & Ox4. AVSS. BS normoactive, passing flatus. LS diminished.  Pt is bosnian speaking, can speak some english. Pt requests  for dr visits.  Pt states \"manageable pain\" Pt dressing drainage marked, no change. Pt abdominal binder is in place. Pt is weaning off TPN. Pt has a double lumen PICC line and blood return is present. Plan to discontinue home today with home care.      "

## 2018-09-26 NOTE — DISCHARGE INSTRUCTIONS
[facility]  Discharge Instructions: Post-Operative Bowel Surgery    ACTIVITY    Expect to feel tired after your surgery.  This will gradually resolve.      Take frequent, short walks and increase your activity gradually.      Avoid strenuous physical activity or heavy lifting greater than 15 lbs. for 4 weeks.  You may climb stairs.      You may drive without restrictions when you are not using any prescription pain medication and feel comfortable in a car.    You may return to work/school when you are comfortable without any prescription pain medication.    You may wear an abdominal binder for comfort for 2-3 weeks from your surgery.  You can wash the abdominal binder and dry it on low heat in the dryer.    WOUND CARE    You may remove your outer dressing or Band-Aids and shower 48 hours after the surgery.  Pat your incisions dry and leave them open to air.  Re-apply dressing (Band-Aids or gauze/tape) as needed for comfort or drainage.    You may have steri-strips (looks like white tape) or staples at your incisions.  You may peel off the steri-strips 2 weeks after your surgery.  If you have staples, they will be removed at your next office visit.    Do not soak your incisions in a tub or pool for 2 weeks.     Do not apply any lotions, creams, or ointments to your incisions.    A ridge under your incisions is normal and will gradually resolve.    DIET    Stay on a low fiber diet until your follow up appointment.  Avoid heavy, spicy, greasy meals and gas forming foods, such as cabbage, broccoli, and onions.      You may find your appetite to be diminished briefly after surgery.  You may take nutritional supplement shakes if you are able.     Drink plenty of fluids to stay hydrated.    PAIN    Expect some tenderness and discomfort at the incision site(s).  Use the prescribed pain medication at your discretion.  Expect gradual resolution of your pain over several days.    You may  take ibuprofen with food (unless you have been told not to) instead of or in addition to your prescribed pain medication.  If you are taking Norco or Percocet, do not take any additional acetaminophen/APAP/Tylenol.    Do not drink alcohol or drive while you are taking pain medications.    You may apply ice to your incisions in 20 minute intervals as needed for the next 24-48 hours.  After that time, consider switching to heat if you prefer.    EXPECTATIONS    ***Pain medications can cause constipation.  Limit use when possible.  Take over the counter stool softener/stimulant, such as Colace or Senna, 1-2 times a day with plenty of water.  You may take a mild over the counter laxative, such as Miralax or a suppository, as needed.  You may discontinue these medications once you are having regular bowel movements and/or are no longer taking your narcotic pain medication.    For laparoscopic surgery, you may have shoulder or upper back discomfort due to the gas used in surgery.  This is temporary and should resolve in 48-72 hours.  Short, frequent walks may help with this.      RETURN APPOINTMENT    Follow up with your surgeon in ***10-14 days.  Please call our office at [phone] to schedule your appointment.  We are located at [address] [place] [address].    CALL OUR OFFICE [phone] IF YOU HAVE:     Chills or fever above 101  F.    Increased redness, warmth, or drainage at your incisions.    Significant bleeding.    Pain not relieved by your pain medication or rest.    Increasing pain after the first 48 hours.    Any other concerns or questions.    Revised May 2018

## 2018-09-26 NOTE — PROGRESS NOTES
Surgery    No complaints this afternoon.  No concerns  Tolerating diet.   + bowel function  Anticipate discontinue today with his daughter.   Discussed recovery and f/u.    Ryder Finn PA-C  Office: [phone]  Pager: [phone]

## 2018-09-26 NOTE — PLAN OF CARE
Problem: Patient Care Overview  Goal: Plan of Care/Patient Progress Review  Outcome: Improving  Pt has been coping well this shift.  Pt is bosnian speaking but speaks some english is able to answer questions for assessments.  Pt requests  for dr visits.  Pt has reported having pain which has been well controlled using PRN oxycodone q 4 hrs.  Pt dressing is clean dry and intact with scant drainage marks which have been marked and remain unchanged. Pt abdominal binder is in place. Pt vital signs have been stable and O2 sats have been maintained on RA.  Pt has TPN running at 85mL/hr continuously and Lipids running as well.  Pt has a double lumen PICC line and blood return is present.  Pt has glucose checks q6hrs and no sliding scale has been administered this shift.  Pt has been tolerating a clear liquid diet.  Pt is continent and has been using the urinal.  Pt had 1 BM this shift which was loose.  Pt has been moving well with just a SBA for safety.  Will continue to monitor.

## 2018-09-27 ENCOUNTER — HOME INFUSION (PRE-WILLOW HOME INFUSION) (OUTPATIENT)
Dept: PHARMACY | Facility: CLINIC | Age: 63
End: 2018-09-27

## 2018-09-27 NOTE — PROGRESS NOTES
This is a recent snapshot of the patient's [facility] medical record.  For current drug dose and complete information and questions, call [phone] or In [facility] pool, fv home infusion ([id number])  CSN Number:  [id number]

## 2018-09-28 ENCOUNTER — HOME INFUSION (PRE-WILLOW HOME INFUSION) (OUTPATIENT)
Dept: PHARMACY | Facility: CLINIC | Age: 63
End: 2018-09-28

## 2018-09-28 ENCOUNTER — MEDICAL CORRESPONDENCE (OUTPATIENT)
Dept: PHARMACY | Facility: CLINIC | Age: 63
End: 2018-09-28

## 2018-09-28 LAB
ALBUMIN SERPL-MCNC: 2 G/DL (ref 3.4–5)
ALP SERPL-CCNC: 341 U/L (ref 40–150)
ALT SERPL W P-5'-P-CCNC: 180 U/L (ref 0–70)
ANION GAP SERPL CALCULATED.3IONS-SCNC: 7 MMOL/L (ref 3–14)
AST SERPL W P-5'-P-CCNC: 87 U/L (ref 0–45)
BASOPHILS # BLD AUTO: 0 10E9/L (ref 0–0.2)
BASOPHILS NFR BLD AUTO: 0.1 %
BILIRUB DIRECT SERPL-MCNC: <0.1 MG/DL (ref 0–0.2)
BILIRUB SERPL-MCNC: 0.2 MG/DL (ref 0.2–1.3)
BUN SERPL-MCNC: 13 MG/DL (ref 7–30)
CALCIUM SERPL-MCNC: 7.9 MG/DL (ref 8.5–10.1)
CHLORIDE SERPL-SCNC: 104 MMOL/L (ref 94–109)
CO2 SERPL-SCNC: 27 MMOL/L (ref 20–32)
CREAT SERPL-MCNC: 0.51 MG/DL (ref 0.66–1.25)
DIFFERENTIAL METHOD BLD: ABNORMAL
EOSINOPHIL # BLD AUTO: 0.3 10E9/L (ref 0–0.7)
EOSINOPHIL NFR BLD AUTO: 3.9 %
ERYTHROCYTE [DISTWIDTH] IN BLOOD BY AUTOMATED COUNT: 12.3 % (ref 10–15)
GFR SERPL CREATININE-BSD FRML MDRD: >90 ML/MIN/1.7M2
GLUCOSE SERPL-MCNC: 64 MG/DL (ref 70–99)
HCT VFR BLD AUTO: 28.9 % (ref 40–53)
HGB BLD-MCNC: 9.4 G/DL (ref 13.3–17.7)
IMM GRANULOCYTES # BLD: 0 10E9/L (ref 0–0.4)
IMM GRANULOCYTES NFR BLD: 0.4 %
LYMPHOCYTES # BLD AUTO: 1.3 10E9/L (ref 0.8–5.3)
LYMPHOCYTES NFR BLD AUTO: 16.3 %
MAGNESIUM SERPL-MCNC: 2.1 MG/DL (ref 1.6–2.3)
MCH RBC QN AUTO: 32.9 PG (ref 26.5–33)
MCHC RBC AUTO-ENTMCNC: 32.5 G/DL (ref 31.5–36.5)
MCV RBC AUTO: 101 FL (ref 78–100)
MONOCYTES # BLD AUTO: 1.3 10E9/L (ref 0–1.3)
MONOCYTES NFR BLD AUTO: 15.8 %
NEUTROPHILS # BLD AUTO: 5.2 10E9/L (ref 1.6–8.3)
NEUTROPHILS NFR BLD AUTO: 63.5 %
PHOSPHATE SERPL-MCNC: 2.9 MG/DL (ref 2.5–4.5)
PLATELET # BLD AUTO: 331 10E9/L (ref 150–450)
POTASSIUM SERPL-SCNC: 4.1 MMOL/L (ref 3.4–5.3)
PREALB SERPL IA-MCNC: 14 MG/DL (ref 15–45)
PROT SERPL-MCNC: 6.1 G/DL (ref 6.8–8.8)
RBC # BLD AUTO: 2.86 10E12/L (ref 4.4–5.9)
SODIUM SERPL-SCNC: 138 MMOL/L (ref 133–144)
TRIGL SERPL-MCNC: 81 MG/DL
WBC # BLD AUTO: 8.1 10E9/L (ref 4–11)

## 2018-09-28 PROCEDURE — 84478 ASSAY OF TRIGLYCERIDES: CPT | Performed by: COLON & RECTAL SURGERY

## 2018-09-28 PROCEDURE — 84134 ASSAY OF PREALBUMIN: CPT | Performed by: COLON & RECTAL SURGERY

## 2018-09-28 PROCEDURE — 82248 BILIRUBIN DIRECT: CPT | Performed by: COLON & RECTAL SURGERY

## 2018-09-28 PROCEDURE — 83735 ASSAY OF MAGNESIUM: CPT | Performed by: COLON & RECTAL SURGERY

## 2018-09-28 PROCEDURE — 85025 COMPLETE CBC W/AUTO DIFF WBC: CPT | Performed by: COLON & RECTAL SURGERY

## 2018-09-28 PROCEDURE — 84100 ASSAY OF PHOSPHORUS: CPT | Performed by: COLON & RECTAL SURGERY

## 2018-09-28 PROCEDURE — 80053 COMPREHEN METABOLIC PANEL: CPT | Performed by: COLON & RECTAL SURGERY

## 2018-10-01 ENCOUNTER — OFFICE VISIT (OUTPATIENT)
Dept: SURGERY | Facility: CLINIC | Age: 63
End: 2018-10-01
Payer: COMMERCIAL

## 2018-10-01 DIAGNOSIS — Z09 SURGERY FOLLOW-UP EXAMINATION: Primary | ICD-10-CM

## 2018-10-01 PROCEDURE — 99024 POSTOP FOLLOW-UP VISIT: CPT | Performed by: SURGERY

## 2018-10-01 NOTE — LETTER
2018    RE: Manuel Andre, :1955      First postoperative visit for  after repair of gastroenterocolonic fistula.  His appetite is normalizing, his bowel function is better and stools are firmer.  He has had no fevers no chills.     On exam his incision is healing nicely, there is no evidence of infection or hernia.     Overall he is recovering well, we discussed the rationale for continuing the TPN for a total of 2 weeks after leaving the hospital.  He will remove his Steri-Strips next week at home.     I reviewed the physical limitations he should follow for the next several weeks and he understands.     He will return to see us as necessary.     If questions please give me a call.     Best regards.    Ace Harding MD       SURGEON: Ace Harding MD   ASSISTANT SURGEON: Momo Andrews MD.  FIRST ASSISTANT: Ryder Finn PA-C , The physicians assistant was medically necessary for their expertise in hemostasis, suctioning, suturing, and retraction.     PREOPERATIVE DIAGNOSIS: Gastro-enteric-colonic fistula.   POSTOPERATIVE DIAGNOSIS: Same, intra-abdominal adhesions.  OPERATIVE PROCEDURE:   1. Abdominal exploration with repair of gastro-enteric-colonic fistula.   2. Lysis of adhesions.   3. Partial gastric resection.  4. Small bowel resection      ANESTHESIA: General.   ESTIMATED BLOOD LOSS: Less than 30 mL.   EVENTS: After induction of general endotracheal anesthesia,Manuel Andre abdomen was prepped and draped in the usual sterile fashion. A secondary midline incision was made, and electrocautery dissection down to and through the abdominal wall fascia. Immediately upon entering the abdomen safely, we encountered some adhesions from the omentum to the abdominal wall. These were taken down carefully, and no evidence of injury noted.  We then noted the mass effect of the fistulized loops of bowel.  We then isolated the stomach which needed some dissection of the liver, transverse colon, and  small bowel leading to the region of the fistulas.  The small intestine was evaluated distally all the way to the cecum and no other pathology noted.  It was clear he had distal gastrectomy with Billroth II reconstruction.  We then isolated all the intestines leading to this fistulous, Dr. Andrews then transected the transverse colon, see his dictation.  I then utilized CADY stapler to transect the stomach, and the afferent and efferent limbs of small bowel.  LigaSure device was utilized to take the mesentery.  Specimen was then sent to pathology.  After this was done Dr. Andrews completed the colocolonic anastomosis, again see his dictation.  I then performed Irvin-en-Y reconstruction by attaching the stomach to the jejunum using a combination of CADY and TA stapler, this was followed by anastomosis of the biliopancreatic limb 60 cm distal to the gastrojejunostomy.  This was also done with CADY and TA staplers.  Mesenteric defect closed with silk suture.  Staple crossing lines were reinforced with 3-0 Vicryl.  Dissection bed and staple lines were coated with a evicel material.  The abdomen was copiously irrigated until effluent was clear and free of debris.  Nasogastric tube was verified to be in good position.  All anastomosis appeared patent and secured.  The abdominal wall fascia was closed with looped 0 PDS. Skin was approximated with 4-0 Monocryl. Steri-Strips and sterile dressings were applied. No immediate complications. All counts correct.      DEEPALI FELTON MD

## 2018-10-01 NOTE — PROGRESS NOTES
This is a recent snapshot of the patient's [facility] medical record.  For current drug dose and complete information and questions, call [phone] or In [facility] pool, fv home infusion ([id number])  CSN Number:  [id number]

## 2018-10-01 NOTE — PROGRESS NOTES
This is a recent snapshot of the patient's [facility] medical record.  For current drug dose and complete information and questions, call [phone] or In [facility] pool, fv home infusion ([id number])  CSN Number:  [id number]

## 2018-10-01 NOTE — MR AVS SNAPSHOT
After Visit Summary   10/1/2018    Manuel Andre    MRN: [id number]           Patient Information     Date Of Birth          1955        Visit Information        Provider Department      10/1/2018 12:15 PM Ace Harding MD; [facility] General Surgery      Today's Diagnoses     Surgery follow-up examination    -  1       Follow-ups after your visit        Who to contact     If you have questions or need follow up information about today's clinic visit or your schedule please contact [facility] directly at [phone].  Normal or non-critical lab and imaging results will be communicated to you by MyChart, letter or phone within 4 business days after the clinic has received the results. If you do not hear from us within 7 days, please contact the clinic through MyChart or phone. If you have a critical or abnormal lab result, we will notify you by phone as soon as possible.  Submit refill requests through [employer] or call your pharmacy and they will forward the refill request to us. Please allow 3 business days for your refill to be completed.          Additional Information About Your Visit        Care EveryWhere ID     This is your Care EveryWhere ID. This could be used by other organizations to access your [place] medical records  [id number]         Blood Pressure from Last 3 Encounters:   09/26/18 97/62   08/06/18 124/84   06/19/18 113/82    Weight from Last 3 Encounters:   09/26/18 128 lb 8.5 oz (58.3 kg)   08/06/18 140 lb (63.5 kg)   06/19/18 140 lb (63.5 kg)              Today, you had the following     No orders found for display       Primary Care Provider Office Phone # Fax #    Adiel Meade [phone]       [place] [address] [place] [address]        Equal Access to Services     CIELO MALDONADO : Everardo Rico, sophia ochoa, jesus bentley  lola salazarmandiejose mccarthyNellaaan ah. [facility] [phone].    ATENCIÓN: Si habla annel, tiene a juarez disposición servicios gratuitos de asistencia lingüística. Pratik al [phone].    We comply with applicable federal civil rights laws and [place] laws. We do not discriminate on the basis of race, color, national origin, age, disability, sex, sexual orientation, or gender identity.            Thank you!     Thank you for choosing [facility]  for your care. Our goal is always to provide you with excellent care. Hearing back from our patients is one way we can continue to improve our services. Please take a few minutes to complete the written survey that you may receive in the mail after your visit with us. Thank you!             Your Updated Medication List - Protect others around you: Learn how to safely use, store and throw away your medicines at www.disposemymeds.org.          This list is accurate as of 10/1/18  1:06 PM.  Always use your most recent med list.                   Brand Name Dispense Instructions for use Diagnosis    cyanocobalamin 1000 MCG/ML injection    VITAMIN B12     Inject 1 mL into the muscle every 30 days        oxyCODONE IR 5 MG tablet    ROXICODONE    20 tablet    Take 1 tablet (5 mg) by mouth every 4 hours as needed for moderate to severe pain    [place]-enteric fistula       ZOFRAN ODT PO      Take 4 mg by mouth 3 times daily as needed for nausea

## 2018-10-03 NOTE — PROGRESS NOTES
First postoperative visit for  after repair of gastroenterocolonic fistula.  His appetite is normalizing, his bowel function is better and stools are firmer.  He has had no fevers no chills.    On exam his incision is healing nicely, there is no evidence of infection or hernia.    Overall he is recovering well, we discussed the rationale for continuing the TPN for a total of 2 weeks after leaving the hospital.  He will remove his Steri-Strips next week at home.    I reviewed the physical limitations he should follow for the next several weeks and he understands.    He will return to see us as necessary.    If questions please give me a call.    Best regards.    Please route or send letter to:  Primary Care Provider (PCP), Referring Provider, Include Op Note and Include Progress Note

## 2018-10-04 ENCOUNTER — HOME INFUSION (PRE-WILLOW HOME INFUSION) (OUTPATIENT)
Dept: PHARMACY | Facility: CLINIC | Age: 63
End: 2018-10-04

## 2018-10-04 ENCOUNTER — APPOINTMENT (OUTPATIENT)
Dept: LAB | Facility: CLINIC | Age: 63
End: 2018-10-04
Attending: COLON & RECTAL SURGERY
Payer: COMMERCIAL

## 2018-10-04 LAB
ALBUMIN SERPL-MCNC: 2.5 G/DL (ref 3.4–5)
ALP SERPL-CCNC: 268 U/L (ref 40–150)
ALT SERPL W P-5'-P-CCNC: 245 U/L (ref 0–70)
ANION GAP SERPL CALCULATED.3IONS-SCNC: 6 MMOL/L (ref 3–14)
AST SERPL W P-5'-P-CCNC: 100 U/L (ref 0–45)
BASOPHILS # BLD AUTO: 0.1 10E9/L (ref 0–0.2)
BASOPHILS NFR BLD AUTO: 0.5 %
BILIRUB SERPL-MCNC: 0.1 MG/DL (ref 0.2–1.3)
BUN SERPL-MCNC: 18 MG/DL (ref 7–30)
CALCIUM SERPL-MCNC: 8.5 MG/DL (ref 8.5–10.1)
CHLORIDE SERPL-SCNC: 106 MMOL/L (ref 94–109)
CO2 SERPL-SCNC: 27 MMOL/L (ref 20–32)
CREAT SERPL-MCNC: 0.63 MG/DL (ref 0.66–1.25)
DIFFERENTIAL METHOD BLD: ABNORMAL
EOSINOPHIL # BLD AUTO: 1.5 10E9/L (ref 0–0.7)
EOSINOPHIL NFR BLD AUTO: 10.7 %
ERYTHROCYTE [DISTWIDTH] IN BLOOD BY AUTOMATED COUNT: 13 % (ref 10–15)
GFR SERPL CREATININE-BSD FRML MDRD: >90 ML/MIN/1.7M2
GLUCOSE SERPL-MCNC: 79 MG/DL (ref 70–99)
HCT VFR BLD AUTO: 29.3 % (ref 40–53)
HGB BLD-MCNC: 9.5 G/DL (ref 13.3–17.7)
IMM GRANULOCYTES # BLD: 0.2 10E9/L (ref 0–0.4)
IMM GRANULOCYTES NFR BLD: 1.7 %
LYMPHOCYTES # BLD AUTO: 2.4 10E9/L (ref 0.8–5.3)
LYMPHOCYTES NFR BLD AUTO: 17.6 %
MCH RBC QN AUTO: 32.8 PG (ref 26.5–33)
MCHC RBC AUTO-ENTMCNC: 32.4 G/DL (ref 31.5–36.5)
MCV RBC AUTO: 101 FL (ref 78–100)
MONOCYTES # BLD AUTO: 1.1 10E9/L (ref 0–1.3)
MONOCYTES NFR BLD AUTO: 8.2 %
NEUTROPHILS # BLD AUTO: 8.3 10E9/L (ref 1.6–8.3)
NEUTROPHILS NFR BLD AUTO: 61.3 %
PLATELET # BLD AUTO: 411 10E9/L (ref 150–450)
POTASSIUM SERPL-SCNC: 4.7 MMOL/L (ref 3.4–5.3)
PROT SERPL-MCNC: 6.4 G/DL (ref 6.8–8.8)
RBC # BLD AUTO: 2.9 10E12/L (ref 4.4–5.9)
SODIUM SERPL-SCNC: 139 MMOL/L (ref 133–144)
WBC # BLD AUTO: 13.5 10E9/L (ref 4–11)

## 2018-10-04 PROCEDURE — 80053 COMPREHEN METABOLIC PANEL: CPT | Performed by: COLON & RECTAL SURGERY

## 2018-10-04 PROCEDURE — 85025 COMPLETE CBC W/AUTO DIFF WBC: CPT | Performed by: COLON & RECTAL SURGERY

## 2018-10-05 ENCOUNTER — HOME INFUSION (PRE-WILLOW HOME INFUSION) (OUTPATIENT)
Dept: PHARMACY | Facility: CLINIC | Age: 63
End: 2018-10-05

## 2018-10-05 PROCEDURE — 87040 BLOOD CULTURE FOR BACTERIA: CPT | Performed by: COLON & RECTAL SURGERY

## 2018-10-05 NOTE — PROGRESS NOTES
This is a recent snapshot of the patient's [facility] medical record.  For current drug dose and complete information and questions, call [phone] or In [facility] pool, fv home infusion ([id number])  CSN Number:  [id number]

## 2018-10-08 ENCOUNTER — HOME INFUSION (PRE-WILLOW HOME INFUSION) (OUTPATIENT)
Dept: PHARMACY | Facility: CLINIC | Age: 63
End: 2018-10-08

## 2018-10-08 NOTE — PROGRESS NOTES
This is a recent snapshot of the patient's [facility] medical record.  For current drug dose and complete information and questions, call [phone] or In [facility] pool, fv home infusion ([id number])  CSN Number:  [id number]

## 2018-10-09 ENCOUNTER — HOME INFUSION (PRE-WILLOW HOME INFUSION) (OUTPATIENT)
Dept: PHARMACY | Facility: CLINIC | Age: 63
End: 2018-10-09

## 2018-10-09 NOTE — PROGRESS NOTES
This is a recent snapshot of the patient's [facility] medical record.  For current drug dose and complete information and questions, call [phone] or In [facility] pool, fv home infusion ([id number])  CSN Number:  [id number]

## 2018-10-10 NOTE — PROGRESS NOTES
This is a recent snapshot of the patient's [facility] medical record.  For current drug dose and complete information and questions, call [phone] or In [facility] pool, fv home infusion ([id number])  CSN Number:  [id number]

## 2018-10-11 ENCOUNTER — HOME INFUSION (PRE-WILLOW HOME INFUSION) (OUTPATIENT)
Dept: PHARMACY | Facility: CLINIC | Age: 63
End: 2018-10-11

## 2018-10-11 LAB
BACTERIA SPEC CULT: NO GROWTH
BACTERIA SPEC CULT: NO GROWTH
Lab: NORMAL
Lab: NORMAL
SPECIMEN SOURCE: NORMAL
SPECIMEN SOURCE: NORMAL

## 2018-10-12 NOTE — PROGRESS NOTES
This is a recent snapshot of the patient's [facility] medical record.  For current drug dose and complete information and questions, call [phone] or In [facility] pool, fv home infusion ([id number])  CSN Number:  [id number]

## 2018-10-16 NOTE — DISCHARGE SUMMARY
"Discharge Summary    Manuel Andre MRN# [id number]   YOB: 1955 Age: 63 year old     Date of Admission:  9/21/2018  Date of Discharge:  9/26/2018  4:01 PM  Admitting Physician:  Ace Harding MD  Discharge Physician:  No att. providers found  Discharging Service:  Colon and Rectal Surgery and General Surgery     Primary Provider: Adiel Meade          Admission Diagnoses:   GASTROCOLIC FISTULA  Gastrocolic fistula          Discharge Diagnosis:   S/p repair of gastro-entero-colic fistula             Discharge Disposition:   Discharged to home           Condition on Discharge:   Discharge condition: Stable   Discharge vitals: Blood pressure 97/62, pulse 76, temperature 99.3  F (37.4  C), temperature source Oral, resp. rate 18, height 5' 8.9\" (1.75 m), weight 128 lb 8.5 oz (58.3 kg), SpO2 97 %.     Code status on discharge: Full Code           Procedures / Labs / Imaging:   Repair of gastro-entero-colic fistula          Medications Prior to Admission:     No prescriptions prior to admission.             Discharge Medications:     No current facility-administered medications for this encounter.      Current Outpatient Prescriptions   Medication Sig     cyanocobalamin (VITAMIN B12) 1000 MCG/ML injection Inject 1 mL into the muscle every 30 days     Ondansetron (ZOFRAN ODT PO) Take 4 mg by mouth 3 times daily as needed for nausea     oxyCODONE IR (ROXICODONE) 5 MG tablet Take 1 tablet (5 mg) by mouth every 4 hours as needed for moderate to severe pain             Consultations:   No consultations were requested during this admission             Brief History of Illness:   Manuel Andre is a 63 year old male who was admitted for repair of gastroenterocolic fistula.  The operation and hospital stay progressed without problems.          Hospital Course:   Patient Active Problem List   Diagnosis     [place]-enteric fistula     Anemia     Lung nodule     Gastrocolic fistula                  Significant Results:   None "             Pending Results:   None           Discharge Instructions and Follow-Up:   Discharge diet: Regular  And receiving TPN   Discharge activity: Lifting restricted to 20 pounds   Discharge follow-up: Follow up with me in 2 weeks   Outpatient therapy: None    Home Care agency: [facility]    Supplies and equipment: None   Lines and drains: PICC line for TPN    Wound care: None   Other instructions: None

## 2018-10-29 ENCOUNTER — TRANSFERRED RECORDS (OUTPATIENT)
Dept: HEALTH INFORMATION MANAGEMENT | Facility: CLINIC | Age: 63
End: 2018-10-29

## 2020-06-09 ENCOUNTER — HOSPITAL ENCOUNTER (EMERGENCY)
Facility: CLINIC | Age: 65
Discharge: HOME | End: 2020-06-09
Attending: EMERGENCY MEDICINE | Admitting: EMERGENCY MEDICINE
Payer: COMMERCIAL

## 2020-06-09 VITALS
OXYGEN SATURATION: 96 % | TEMPERATURE: 97.9 F | SYSTOLIC BLOOD PRESSURE: 150 MMHG | DIASTOLIC BLOOD PRESSURE: 90 MMHG | RESPIRATION RATE: 20 BRPM

## 2020-06-09 DIAGNOSIS — R10.13 ABDOMINAL PAIN, EPIGASTRIC: ICD-10-CM

## 2020-06-09 LAB
ALBUMIN SERPL-MCNC: 3.5 G/DL (ref 3.4–5)
ALP SERPL-CCNC: 109 U/L (ref 40–150)
ALT SERPL W P-5'-P-CCNC: 57 U/L (ref 0–70)
ANION GAP SERPL CALCULATED.3IONS-SCNC: 5 MMOL/L (ref 3–14)
AST SERPL W P-5'-P-CCNC: 96 U/L (ref 0–45)
BASOPHILS # BLD AUTO: 0 10E9/L (ref 0–0.2)
BASOPHILS NFR BLD AUTO: 0.1 %
BILIRUB SERPL-MCNC: 1.6 MG/DL (ref 0.2–1.3)
BUN SERPL-MCNC: 13 MG/DL (ref 7–30)
CALCIUM SERPL-MCNC: 8.7 MG/DL (ref 8.5–10.1)
CHLORIDE SERPL-SCNC: 109 MMOL/L (ref 94–109)
CO2 SERPL-SCNC: 26 MMOL/L (ref 20–32)
CREAT SERPL-MCNC: 0.78 MG/DL (ref 0.66–1.25)
DIFFERENTIAL METHOD BLD: ABNORMAL
EOSINOPHIL # BLD AUTO: 0.5 10E9/L (ref 0–0.7)
EOSINOPHIL NFR BLD AUTO: 3.6 %
ERYTHROCYTE [DISTWIDTH] IN BLOOD BY AUTOMATED COUNT: 13 % (ref 10–15)
GFR SERPL CREATININE-BSD FRML MDRD: >90 ML/MIN/{1.73_M2}
GLUCOSE SERPL-MCNC: 155 MG/DL (ref 70–99)
HCT VFR BLD AUTO: 44 % (ref 40–53)
HGB BLD-MCNC: 15.2 G/DL (ref 13.3–17.7)
IMM GRANULOCYTES # BLD: 0 10E9/L (ref 0–0.4)
IMM GRANULOCYTES NFR BLD: 0.2 %
INTERPRETATION ECG - MUSE: NORMAL
LIPASE SERPL-CCNC: 72 U/L (ref 73–393)
LYMPHOCYTES # BLD AUTO: 0.7 10E9/L (ref 0.8–5.3)
LYMPHOCYTES NFR BLD AUTO: 5.1 %
MCH RBC QN AUTO: 31.3 PG (ref 26.5–33)
MCHC RBC AUTO-ENTMCNC: 34.5 G/DL (ref 31.5–36.5)
MCV RBC AUTO: 91 FL (ref 78–100)
MONOCYTES # BLD AUTO: 0.4 10E9/L (ref 0–1.3)
MONOCYTES NFR BLD AUTO: 2.8 %
NEUTROPHILS # BLD AUTO: 11.9 10E9/L (ref 1.6–8.3)
NEUTROPHILS NFR BLD AUTO: 88.2 %
NRBC # BLD AUTO: 0 10*3/UL
NRBC BLD AUTO-RTO: 0 /100
PLATELET # BLD AUTO: 240 10E9/L (ref 150–450)
POTASSIUM SERPL-SCNC: 3.5 MMOL/L (ref 3.4–5.3)
PROT SERPL-MCNC: 7.7 G/DL (ref 6.8–8.8)
RBC # BLD AUTO: 4.86 10E12/L (ref 4.4–5.9)
SODIUM SERPL-SCNC: 140 MMOL/L (ref 133–144)
WBC # BLD AUTO: 13.5 10E9/L (ref 4–11)

## 2020-06-09 PROCEDURE — 83690 ASSAY OF LIPASE: CPT | Performed by: EMERGENCY MEDICINE

## 2020-06-09 PROCEDURE — 93005 ELECTROCARDIOGRAM TRACING: CPT

## 2020-06-09 PROCEDURE — 96375 TX/PRO/DX INJ NEW DRUG ADDON: CPT

## 2020-06-09 PROCEDURE — 99285 EMERGENCY DEPT VISIT HI MDM: CPT | Mod: 25

## 2020-06-09 PROCEDURE — 25000128 H RX IP 250 OP 636: Performed by: EMERGENCY MEDICINE

## 2020-06-09 PROCEDURE — 25800030 ZZH RX IP 258 OP 636: Performed by: EMERGENCY MEDICINE

## 2020-06-09 PROCEDURE — 96374 THER/PROPH/DIAG INJ IV PUSH: CPT

## 2020-06-09 PROCEDURE — 96361 HYDRATE IV INFUSION ADD-ON: CPT

## 2020-06-09 PROCEDURE — 80053 COMPREHEN METABOLIC PANEL: CPT | Performed by: EMERGENCY MEDICINE

## 2020-06-09 PROCEDURE — C9113 INJ PANTOPRAZOLE SODIUM, VIA: HCPCS | Performed by: EMERGENCY MEDICINE

## 2020-06-09 PROCEDURE — 85025 COMPLETE CBC W/AUTO DIFF WBC: CPT | Performed by: EMERGENCY MEDICINE

## 2020-06-09 RX ORDER — MORPHINE SULFATE 4 MG/ML
4 INJECTION, SOLUTION INTRAMUSCULAR; INTRAVENOUS
Status: DISCONTINUED | OUTPATIENT
Start: 2020-06-09 | End: 2020-06-09 | Stop reason: HOSPADM

## 2020-06-09 RX ORDER — ONDANSETRON 2 MG/ML
4 INJECTION INTRAMUSCULAR; INTRAVENOUS EVERY 30 MIN PRN
Status: DISCONTINUED | OUTPATIENT
Start: 2020-06-09 | End: 2020-06-09 | Stop reason: HOSPADM

## 2020-06-09 RX ORDER — IOPAMIDOL 755 MG/ML
64 INJECTION, SOLUTION INTRAVASCULAR ONCE
Status: DISCONTINUED | OUTPATIENT
Start: 2020-06-09 | End: 2020-06-09 | Stop reason: CLARIF

## 2020-06-09 RX ADMIN — MORPHINE SULFATE 4 MG: 4 INJECTION INTRAVENOUS at 03:54

## 2020-06-09 RX ADMIN — PANTOPRAZOLE SODIUM 40 MG: 40 INJECTION, POWDER, FOR SOLUTION INTRAVENOUS at 03:55

## 2020-06-09 RX ADMIN — ONDANSETRON 4 MG: 2 INJECTION INTRAMUSCULAR; INTRAVENOUS at 03:51

## 2020-06-09 RX ADMIN — SODIUM CHLORIDE 1000 ML: 9 INJECTION, SOLUTION INTRAVENOUS at 03:56

## 2020-06-09 ASSESSMENT — ENCOUNTER SYMPTOMS
FEVER: 0
ABDOMINAL PAIN: 1
VOMITING: 1
DIARRHEA: 0

## 2020-06-09 NOTE — ED AVS SNAPSHOT
Emergency Department  [address] [place] [id number]  Phone:  [phone]  Fax:  [phone]                                    Manuel Andre   MRN: [id number]    Department:   Emergency Department   Date of Visit:  6/9/2020           After Visit Summary Signature Page    I have received my discharge instructions, and my questions have been answered. I have discussed any challenges I see with this plan with the nurse or doctor.    ..........................................................................................................................................  Patient/Patient Representative Signature      ..........................................................................................................................................  Patient Representative Print Name and Relationship to Patient    ..................................................               ................................................  Date                                   Time    ..........................................................................................................................................  Reviewed by Signature/Title    ...................................................              ..............................................  Date                                               Time          22EPIC Rev 08/18

## 2020-06-09 NOTE — ED PROVIDER NOTES
History     Chief Complaint:  Abdominal Pain    RAFAEL Andre is a 65 year old male with a history of anemia who presents to the emergency department for evaluation of abdominal pain. The patient reports that about 4-5 hours ago he developed epigastric pain that has gradually worsened. The patient denies any fever, chest pain, shortness of breath, diarrhea or urinary symptoms.     Allergies:  No Known Drug Allergies    Medications:    Zofran  Roxicodone    Past Medical History:    Anemia  [place]-enteric fistula  History of blood transfusion  Lung nodule  Tobacco use    Past Surgical History:    Abdomen surgery  Colectomy  Colonoscopy  Hernia repair  Exploratory laparotomy  Stomach ulcer surgery    Family History:    Mother: cancer, thyroid disease    Social History:  Smoking Status: Current everyday  Smokeless Tobacco: Never  Alcohol Use: Yes  Drug Use: No  Marital Status:        Review of Systems   Constitutional: Negative for fever.   Cardiovascular: Negative for chest pain.   Gastrointestinal: Positive for abdominal pain and vomiting. Negative for diarrhea.   Genitourinary: Negative.    All other systems reviewed and are negative.    Physical Exam   Vitals:  Patient Vitals for the past 24 hrs:   BP Temp Temp src Resp SpO2   06/09/20 0535 (!) 150/90 -- -- -- --   06/09/20 0436 -- -- -- -- 96 %   06/09/20 0331 (!) 159/91 97.9  F (36.6  C) Oral 20 100 %       Physical Exam    General: Appears uncomfortable  Eyes:  The pupils are equal and round    Conjunctivae and sclerae are normal  ENT:    Moist mucous membranes  Neck:  Normal range of motion  CV:  Regular rate     Skin warm and well perfused   Resp:  Non labored breathing on room air  GI:  Abdomen is soft, there is no rigidity    No distension    No rebound tenderness     Mild epigastric tenderness    Healed surgical incision  MS:  Normal muscular tone  Skin:  No rash or acute skin lesions noted  Neuro:   Awake, alert.      Speech is normal and  fluent.    Face is symmetric.     Moves all extremities equally  Psych: Normal affect.  Appropriate interactions.    Emergency Department Course   ECG:  Completed at 0358.  Read at 0400.   Rate 80 bpm. PA interval 160. QRS duration 104. QT/QTc 418/482. P-R-T axes 83 48 61.  Normal sinus rhythm  Prolonged QT  No significant change when compared to EKG dated 09/13/2018.    Laboratory:  CBC: WBC 13.5 (H) o/w WNL. (HGB 15.2, )   CMP: Glucose 155 (H), Bilirubin Total 1.6 (H), AST 96 (H) o/w  AWNL (Creatinine 0.78)    Lipase: 72 (L)     Interventions:  0351 Zofran 4 mg IV  0354 Morphine 4 mg IV  0355 Protonix 40 mg IV  0356 NS, 1 L, IV bolus     Emergency Department Course:  Past medical records, nursing notes, and vitals reviewed.    0340 I performed an exam of the patient as documented above.     EKG obtained in the ED, see results above.   IV was inserted and blood was drawn for laboratory testing, results above.  The patient was sent for a abdomen CT while in the emergency department, results above.     Vomited while in ED and pain resolved after this    0531 I rechecked the patient who is now refusing to have a CT or US done. He states that he feels improved and would like to be discharged.     Findings and plan explained to the Patient. Patient discharged home with instructions regarding supportive care, medications, and reasons to return. The importance of close follow-up was reviewed.     I personally reviewed the laboratory results with the Patient and answered all related questions prior to discharge.    Impression & Plan      Medical Decision Making:  Manuel Andre is a 65 year old male who presented to the ED with abdominal pain. Patient with epigastric abdominal pain. Appeared uncomfortable on initial arrival to ED. Vomited in ED and then symptoms resolved. Labs with mild leukocytosis, mild elevation of AST/total bilirubin. Attempted to get CT abdomen given his history but he declined this as pain  resolved. Discussed mild lab abnormalities and discussed that US gallbladder would be additional test that would be indicated but he again declines this as well since he feels back to normal. He wants to go home as he has no pain anymore. Discussed that he should return to ED if pain returns, develops fever or other new or concerning symptoms develop. May have had biliary colic causing pain.     Diagnosis:    ICD-10-CM    1. Abdominal pain, epigastric  R10.13        Disposition:  discharged to home    Discharge Medications:  None    I, Josué Aguero, am serving as a scribe on 6/9/2020 at 3:40 AM to personally document services performed by Marcela Marmolejo MD based on my observations and the provider's statements to me.   Josué Aguero  6/9/2020    EMERGENCY DEPARTMENT       Marcela Marmolejo MD  06/09/20 0558

## 2024-11-25 NOTE — PROGRESS NOTES
Health Maintenance       Shingles Vaccine (1 of 2)  Never done    Hepatitis C Screening (Once)  Never done    Influenza Vaccine (1)  Overdue since 9/1/2024    COVID-19 Vaccine (3 - 2024-25 season)  Overdue since 9/1/2024           Following review of the above:  Patient is not proceeding with: COVID-19, Influenza, and Shingles    Note: Refer to final orders and clinician documentation.       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   - advance diet to low residue - for a week then regular diet  - discussed with CRS team to stay on TPN for couple weeks as:  Malnutrition Diagnosis: Severe malnutrition  % Weight Loss:  > 10% in 6 months (severe malnutrition)  % Intake:  No decreased intake noted, but question whether nutrients consumed were being absorbed due to chronic diarrhea  Subcutaneous Fat Loss:  Orbital region moderate depletion and Upper arm region moderate depletion  Muscle Loss:  Temporal region moderate depletion and Clavicle bone region severe depletion  Fluid Retention:  Mild-Moderate suspected- Likely nutrition related    - OK with home today if tolerating new diet    Ace Harding MD  [facility].  [phone]          "

## 2025-03-10 NOTE — PROGRESS NOTES
"General Surgery Progress Note    Subjective: good pain control, ambulating, no flatus yet    Objective: BP (!) 134/93 (BP Location: Left arm)  Pulse 87  Temp 98.4  F (36.9  C) (Oral)  Resp 18  Ht 5' 8.9\" (1.75 m)  Wt 133 lb 9.6 oz (60.6 kg)  SpO2 97%  BMI 19.79 kg/m2  Gen: comfortable  Mouth: mucosa well hydrated  Eyes: anicteric  Pulm: nonlabored breathing  Abd: soft, flat, incision dry and intact  Ext: WWP    Assessment/Plan:   Manuel Andre  is a 63 year old male 2d sp takedown of gastro-entero-colonic fistula  - await gi function  - will do ng clamping trial, start sips of clears    Ace Harding MD  [facility].  [phone]        " 13-Mar-2025

## (undated) DEVICE — STPL LINEAR CUT 60X3.5MM TX60B

## (undated) DEVICE — SU VICRYL 3-0 SH CR 8X18" J774

## (undated) DEVICE — SPONGE LAP 18X18" 23250-400

## (undated) DEVICE — ESU GROUND PAD UNIVERSAL W/O CORD

## (undated) DEVICE — STPL RELOAD LINEAR 60X3.5MM XR60B

## (undated) DEVICE — SU PDS II 0 CTX 60" Z990G

## (undated) DEVICE — ESU ELEC BLADE 6" COATED E1450-6

## (undated) DEVICE — DRAIN JACKSON PRATT 15FR ROUND SU130-1323

## (undated) DEVICE — ESU HOLSTER PLASTIC DISP E2400

## (undated) DEVICE — SU VICRYL 2-0 TIE 12X18" J905T

## (undated) DEVICE — SU VICRYL 2-0 SH 27" J317H

## (undated) DEVICE — SU VICRYL 0 TIE 12X18" J906G

## (undated) DEVICE — STPL LINEAR CUT 75MM TLC75

## (undated) DEVICE — Device

## (undated) DEVICE — DRSG STERI STRIP 1/2X4" R1547

## (undated) DEVICE — STPL RELOAD LINEAR CUT 75MM THICK TRT75

## (undated) DEVICE — LINEN TOWEL PACK X5 5464

## (undated) DEVICE — SU SILK 3-0 SH 30" K832H

## (undated) DEVICE — SOL WATER IRRIG 1000ML BOTTLE 2F7114

## (undated) DEVICE — SU VICRYL 3-0 SH 27" UND J416H

## (undated) DEVICE — GLOVE PROTEXIS MICRO 7.5  2D73PM75

## (undated) DEVICE — GLOVE PROTEXIS BLUE W/NEU-THERA 7.5  2D73EB75

## (undated) DEVICE — COVER CLAMP FABRIC RADIOPAQUE 9.5X150MM 072002PBX

## (undated) DEVICE — DRSG GAUZE 4X4" 3033

## (undated) DEVICE — STPL RELOAD LINEAR CUT 75MM TCR75

## (undated) DEVICE — PACK MAJOR SBA15MAFSI

## (undated) DEVICE — PREP CHLORAPREP 26ML TINTED ORANGE  260815

## (undated) DEVICE — ESU ELEC BLADE 4" COATED

## (undated) DEVICE — MANIFOLD NEPTUNE 4 PORT 700-20

## (undated) DEVICE — ESU LIGASURE IMPACT OPEN SEALER/DVDR CVD LG JAW LF4418

## (undated) DEVICE — DRAPE LAP W/ARMBOARD 29410

## (undated) DEVICE — DRAIN JACKSON PRATT RESERVOIR 100ML SU130-1305

## (undated) DEVICE — DRSG TELFA ISLAND 4X10"

## (undated) DEVICE — BNDG ABDOMINAL BINDER 9X30-45" 79-89070

## (undated) RX ORDER — PROPOFOL 10 MG/ML
INJECTION, EMULSION INTRAVENOUS
Status: DISPENSED
Start: 2018-09-21

## (undated) RX ORDER — ONDANSETRON 2 MG/ML
INJECTION INTRAMUSCULAR; INTRAVENOUS
Status: DISPENSED
Start: 2018-09-21

## (undated) RX ORDER — HYDROMORPHONE HYDROCHLORIDE 1 MG/ML
INJECTION, SOLUTION INTRAMUSCULAR; INTRAVENOUS; SUBCUTANEOUS
Status: DISPENSED
Start: 2018-09-21

## (undated) RX ORDER — GLYCOPYRROLATE 0.2 MG/ML
INJECTION, SOLUTION INTRAMUSCULAR; INTRAVENOUS
Status: DISPENSED
Start: 2018-09-21

## (undated) RX ORDER — LIDOCAINE HYDROCHLORIDE 20 MG/ML
INJECTION, SOLUTION EPIDURAL; INFILTRATION; INTRACAUDAL; PERINEURAL
Status: DISPENSED
Start: 2018-09-21

## (undated) RX ORDER — FENTANYL CITRATE 50 UG/ML
INJECTION, SOLUTION INTRAMUSCULAR; INTRAVENOUS
Status: DISPENSED
Start: 2018-09-21

## (undated) RX ORDER — ACETAMINOPHEN 325 MG/1
TABLET ORAL
Status: DISPENSED
Start: 2018-09-21

## (undated) RX ORDER — VECURONIUM BROMIDE 1 MG/ML
INJECTION, POWDER, LYOPHILIZED, FOR SOLUTION INTRAVENOUS
Status: DISPENSED
Start: 2018-09-21

## (undated) RX ORDER — CELECOXIB 200 MG/1
CAPSULE ORAL
Status: DISPENSED
Start: 2018-09-21

## (undated) RX ORDER — CEFOTETAN DISODIUM 1 G/10ML
INJECTION, POWDER, FOR SOLUTION INTRAMUSCULAR; INTRAVENOUS
Status: DISPENSED
Start: 2018-09-21

## (undated) RX ORDER — FENTANYL CITRATE 50 UG/ML
INJECTION, SOLUTION INTRAMUSCULAR; INTRAVENOUS
Status: DISPENSED
Start: 2018-06-19

## (undated) RX ORDER — NEOSTIGMINE METHYLSULFATE 1 MG/ML
VIAL (ML) INJECTION
Status: DISPENSED
Start: 2018-09-21

## (undated) RX ORDER — DEXAMETHASONE SODIUM PHOSPHATE 4 MG/ML
INJECTION, SOLUTION INTRA-ARTICULAR; INTRALESIONAL; INTRAMUSCULAR; INTRAVENOUS; SOFT TISSUE
Status: DISPENSED
Start: 2018-09-21

## (undated) RX ORDER — BUPIVACAINE HYDROCHLORIDE 2.5 MG/ML
INJECTION, SOLUTION EPIDURAL; INFILTRATION; INTRACAUDAL
Status: DISPENSED
Start: 2018-09-21

## (undated) RX ORDER — ALVIMOPAN 12 MG/1
CAPSULE ORAL
Status: DISPENSED
Start: 2018-09-21

## (undated) RX ORDER — EPINEPHRINE 1 MG/ML
INJECTION, SOLUTION INTRAMUSCULAR; SUBCUTANEOUS
Status: DISPENSED
Start: 2018-09-21

## (undated) RX ORDER — LIDOCAINE HYDROCHLORIDE 10 MG/ML
INJECTION, SOLUTION INFILTRATION; PERINEURAL
Status: DISPENSED
Start: 2018-09-21